# Patient Record
Sex: FEMALE | Race: WHITE | NOT HISPANIC OR LATINO | Employment: OTHER | URBAN - METROPOLITAN AREA
[De-identification: names, ages, dates, MRNs, and addresses within clinical notes are randomized per-mention and may not be internally consistent; named-entity substitution may affect disease eponyms.]

---

## 2017-11-11 ENCOUNTER — HOSPITAL ENCOUNTER (INPATIENT)
Facility: HOSPITAL | Age: 82
LOS: 5 days | Discharge: HOME | DRG: 871 | End: 2017-11-16
Attending: EMERGENCY MEDICINE | Admitting: STUDENT IN AN ORGANIZED HEALTH CARE EDUCATION/TRAINING PROGRAM
Payer: MEDICARE

## 2017-11-11 ENCOUNTER — APPOINTMENT (EMERGENCY)
Dept: RADIOLOGY | Facility: HOSPITAL | Age: 82
DRG: 871 | End: 2017-11-11
Payer: MEDICARE

## 2017-11-11 DIAGNOSIS — J44.1 COPD EXACERBATION (HCC): ICD-10-CM

## 2017-11-11 DIAGNOSIS — J18.9 PNEUMONIA: Primary | ICD-10-CM

## 2017-11-11 PROBLEM — J96.00 ACUTE RESPIRATORY FAILURE (HCC): Status: ACTIVE | Noted: 2017-11-11

## 2017-11-11 PROBLEM — E78.5 HLD (HYPERLIPIDEMIA): Status: ACTIVE | Noted: 2017-11-11

## 2017-11-11 PROBLEM — E87.6 HYPOKALEMIA: Status: ACTIVE | Noted: 2017-11-11

## 2017-11-11 LAB
ALBUMIN SERPL BCP-MCNC: 3.3 G/DL (ref 3.5–5)
ALP SERPL-CCNC: 109 U/L (ref 46–116)
ALT SERPL W P-5'-P-CCNC: 19 U/L (ref 12–78)
ANION GAP SERPL CALCULATED.3IONS-SCNC: 9 MMOL/L (ref 4–13)
AST SERPL W P-5'-P-CCNC: 11 U/L (ref 5–45)
BILIRUB SERPL-MCNC: 0.8 MG/DL (ref 0.2–1)
BUN SERPL-MCNC: 14 MG/DL (ref 5–25)
CALCIUM SERPL-MCNC: 8.4 MG/DL (ref 8.3–10.1)
CHLORIDE SERPL-SCNC: 99 MMOL/L (ref 100–108)
CO2 SERPL-SCNC: 31 MMOL/L (ref 21–32)
CREAT SERPL-MCNC: 0.57 MG/DL (ref 0.6–1.3)
ERYTHROCYTE [DISTWIDTH] IN BLOOD BY AUTOMATED COUNT: 14.4 % (ref 11.6–15.1)
GFR SERPL CREATININE-BSD FRML MDRD: 87 ML/MIN/1.73SQ M
GLUCOSE SERPL-MCNC: 126 MG/DL (ref 65–140)
HCT VFR BLD AUTO: 39.5 % (ref 37–47)
HGB BLD-MCNC: 13.1 G/DL (ref 12–16)
LACTATE SERPL-SCNC: 1.3 MMOL/L (ref 0.5–2)
LYMPHOCYTES # BLD AUTO: 0.32 THOUSAND/UL (ref 0.6–4.47)
LYMPHOCYTES # BLD AUTO: 3 %
MAGNESIUM SERPL-MCNC: 1.5 MG/DL (ref 1.6–2.6)
MCH RBC QN AUTO: 32.3 PG (ref 27–31)
MCHC RBC AUTO-ENTMCNC: 33.3 G/DL (ref 31.4–37.4)
MCV RBC AUTO: 97 FL (ref 82–98)
MONOCYTES # BLD AUTO: 0.65 THOUSAND/UL (ref 0–1.22)
MONOCYTES NFR BLD AUTO: 6 % (ref 4–12)
NEUTS BAND NFR BLD MANUAL: 31 % (ref 0–8)
NEUTS SEG # BLD: 9.83 THOUSAND/UL (ref 1.81–6.82)
NEUTS SEG NFR BLD AUTO: 60 %
NRBC BLD AUTO-RTO: 0 /100 WBCS
PLATELET # BLD AUTO: 207 THOUSANDS/UL (ref 130–400)
PLATELET BLD QL SMEAR: ADEQUATE
PMV BLD AUTO: 8 FL (ref 8.9–12.7)
POTASSIUM SERPL-SCNC: 2.8 MMOL/L (ref 3.5–5.3)
PROT SERPL-MCNC: 6.6 G/DL (ref 6.4–8.2)
RBC # BLD AUTO: 4.07 MILLION/UL (ref 4.2–5.4)
SODIUM SERPL-SCNC: 139 MMOL/L (ref 136–145)
TOTAL CELLS COUNTED SPEC: 100
TROPONIN I SERPL-MCNC: <0.02 NG/ML
WBC # BLD AUTO: 10.8 THOUSAND/UL (ref 4.8–10.8)

## 2017-11-11 PROCEDURE — 84484 ASSAY OF TROPONIN QUANT: CPT | Performed by: EMERGENCY MEDICINE

## 2017-11-11 PROCEDURE — 96365 THER/PROPH/DIAG IV INF INIT: CPT

## 2017-11-11 PROCEDURE — 36415 COLL VENOUS BLD VENIPUNCTURE: CPT | Performed by: EMERGENCY MEDICINE

## 2017-11-11 PROCEDURE — 99285 EMERGENCY DEPT VISIT HI MDM: CPT

## 2017-11-11 PROCEDURE — 71275 CT ANGIOGRAPHY CHEST: CPT

## 2017-11-11 PROCEDURE — 80053 COMPREHEN METABOLIC PANEL: CPT | Performed by: EMERGENCY MEDICINE

## 2017-11-11 PROCEDURE — 87081 CULTURE SCREEN ONLY: CPT | Performed by: STUDENT IN AN ORGANIZED HEALTH CARE EDUCATION/TRAINING PROGRAM

## 2017-11-11 PROCEDURE — 71010 HB CHEST X-RAY 1 VIEW FRONTAL (PORTABLE): CPT

## 2017-11-11 PROCEDURE — 83735 ASSAY OF MAGNESIUM: CPT | Performed by: STUDENT IN AN ORGANIZED HEALTH CARE EDUCATION/TRAINING PROGRAM

## 2017-11-11 PROCEDURE — 85027 COMPLETE CBC AUTOMATED: CPT | Performed by: EMERGENCY MEDICINE

## 2017-11-11 PROCEDURE — 96375 TX/PRO/DX INJ NEW DRUG ADDON: CPT

## 2017-11-11 PROCEDURE — 87040 BLOOD CULTURE FOR BACTERIA: CPT | Performed by: EMERGENCY MEDICINE

## 2017-11-11 PROCEDURE — 96361 HYDRATE IV INFUSION ADD-ON: CPT

## 2017-11-11 PROCEDURE — 93005 ELECTROCARDIOGRAM TRACING: CPT | Performed by: EMERGENCY MEDICINE

## 2017-11-11 PROCEDURE — 83605 ASSAY OF LACTIC ACID: CPT | Performed by: EMERGENCY MEDICINE

## 2017-11-11 PROCEDURE — 94640 AIRWAY INHALATION TREATMENT: CPT

## 2017-11-11 PROCEDURE — 87798 DETECT AGENT NOS DNA AMP: CPT | Performed by: EMERGENCY MEDICINE

## 2017-11-11 PROCEDURE — 85007 BL SMEAR W/DIFF WBC COUNT: CPT | Performed by: EMERGENCY MEDICINE

## 2017-11-11 PROCEDURE — 94664 DEMO&/EVAL PT USE INHALER: CPT

## 2017-11-11 RX ORDER — ATORVASTATIN CALCIUM 40 MG/1
40 TABLET, FILM COATED ORAL
Status: DISCONTINUED | OUTPATIENT
Start: 2017-11-12 | End: 2017-11-16 | Stop reason: HOSPADM

## 2017-11-11 RX ORDER — RISPERIDONE 0.5 MG/1
0.5 TABLET, FILM COATED ORAL
COMMUNITY

## 2017-11-11 RX ORDER — ACETAMINOPHEN 325 MG/1
650 TABLET ORAL EVERY 6 HOURS PRN
Status: DISCONTINUED | OUTPATIENT
Start: 2017-11-11 | End: 2017-11-16 | Stop reason: HOSPADM

## 2017-11-11 RX ORDER — BUDESONIDE 0.5 MG/2ML
0.5 INHALANT ORAL DAILY
COMMUNITY

## 2017-11-11 RX ORDER — LEVOFLOXACIN 5 MG/ML
750 INJECTION, SOLUTION INTRAVENOUS ONCE
Status: COMPLETED | OUTPATIENT
Start: 2017-11-11 | End: 2017-11-11

## 2017-11-11 RX ORDER — CITALOPRAM 20 MG/1
10 TABLET ORAL DAILY
Status: DISCONTINUED | OUTPATIENT
Start: 2017-11-12 | End: 2017-11-16 | Stop reason: HOSPADM

## 2017-11-11 RX ORDER — ACETAMINOPHEN 325 MG/1
650 TABLET ORAL ONCE
Status: COMPLETED | OUTPATIENT
Start: 2017-11-11 | End: 2017-11-11

## 2017-11-11 RX ORDER — METHYLPREDNISOLONE SODIUM SUCCINATE 125 MG/2ML
125 INJECTION, POWDER, LYOPHILIZED, FOR SOLUTION INTRAMUSCULAR; INTRAVENOUS ONCE
Status: COMPLETED | OUTPATIENT
Start: 2017-11-11 | End: 2017-11-11

## 2017-11-11 RX ORDER — ASPIRIN 81 MG/1
81 TABLET, CHEWABLE ORAL DAILY
Status: DISCONTINUED | OUTPATIENT
Start: 2017-11-12 | End: 2017-11-16 | Stop reason: HOSPADM

## 2017-11-11 RX ORDER — IPRATROPIUM BROMIDE AND ALBUTEROL SULFATE 2.5; .5 MG/3ML; MG/3ML
3 SOLUTION RESPIRATORY (INHALATION)
Status: DISCONTINUED | OUTPATIENT
Start: 2017-11-11 | End: 2017-11-16 | Stop reason: HOSPADM

## 2017-11-11 RX ORDER — IPRATROPIUM BROMIDE AND ALBUTEROL SULFATE 2.5; .5 MG/3ML; MG/3ML
3 SOLUTION RESPIRATORY (INHALATION) ONCE
Status: COMPLETED | OUTPATIENT
Start: 2017-11-11 | End: 2017-11-11

## 2017-11-11 RX ORDER — RISPERIDONE 0.25 MG/1
0.25 TABLET, FILM COATED ORAL DAILY
COMMUNITY

## 2017-11-11 RX ORDER — MAGNESIUM SULFATE HEPTAHYDRATE 40 MG/ML
2 INJECTION, SOLUTION INTRAVENOUS ONCE
Status: COMPLETED | OUTPATIENT
Start: 2017-11-11 | End: 2017-11-15

## 2017-11-11 RX ORDER — METHYLPREDNISOLONE SODIUM SUCCINATE 125 MG/2ML
60 INJECTION, POWDER, LYOPHILIZED, FOR SOLUTION INTRAMUSCULAR; INTRAVENOUS EVERY 8 HOURS
Status: DISCONTINUED | OUTPATIENT
Start: 2017-11-12 | End: 2017-11-12

## 2017-11-11 RX ORDER — ASPIRIN 81 MG/1
81 TABLET, CHEWABLE ORAL DAILY
COMMUNITY

## 2017-11-11 RX ORDER — ALBUTEROL SULFATE 2.5 MG/3ML
2.5 SOLUTION RESPIRATORY (INHALATION) EVERY 6 HOURS PRN
Status: DISCONTINUED | OUTPATIENT
Start: 2017-11-11 | End: 2017-11-16 | Stop reason: HOSPADM

## 2017-11-11 RX ORDER — BRIMONIDINE TARTRATE, TIMOLOL MALEATE 2; 5 MG/ML; MG/ML
1 SOLUTION/ DROPS OPHTHALMIC EVERY 12 HOURS SCHEDULED
COMMUNITY

## 2017-11-11 RX ORDER — CITALOPRAM 10 MG/1
10 TABLET ORAL DAILY
COMMUNITY

## 2017-11-11 RX ORDER — LANOLIN ALCOHOL/MO/W.PET/CERES
CREAM (GRAM) TOPICAL DAILY
COMMUNITY

## 2017-11-11 RX ORDER — ATORVASTATIN CALCIUM 40 MG/1
40 TABLET, FILM COATED ORAL DAILY
COMMUNITY

## 2017-11-11 RX ORDER — HEPARIN SODIUM 5000 [USP'U]/ML
5000 INJECTION, SOLUTION INTRAVENOUS; SUBCUTANEOUS EVERY 8 HOURS SCHEDULED
Status: DISCONTINUED | OUTPATIENT
Start: 2017-11-11 | End: 2017-11-16 | Stop reason: HOSPADM

## 2017-11-11 RX ORDER — POTASSIUM CHLORIDE 20 MEQ/1
40 TABLET, EXTENDED RELEASE ORAL ONCE
Status: COMPLETED | OUTPATIENT
Start: 2017-11-11 | End: 2017-11-11

## 2017-11-11 RX ADMIN — POTASSIUM CHLORIDE 40 MEQ: 1500 TABLET, EXTENDED RELEASE ORAL at 20:02

## 2017-11-11 RX ADMIN — IPRATROPIUM BROMIDE AND ALBUTEROL SULFATE 3 ML: .5; 3 SOLUTION RESPIRATORY (INHALATION) at 23:37

## 2017-11-11 RX ADMIN — LEVOFLOXACIN 750 MG: 5 INJECTION, SOLUTION INTRAVENOUS at 19:02

## 2017-11-11 RX ADMIN — IOHEXOL 85 ML: 350 INJECTION, SOLUTION INTRAVENOUS at 20:57

## 2017-11-11 RX ADMIN — SODIUM CHLORIDE 1000 ML: 0.9 INJECTION, SOLUTION INTRAVENOUS at 18:59

## 2017-11-11 RX ADMIN — METHYLPREDNISOLONE SODIUM SUCCINATE 125 MG: 125 INJECTION, POWDER, FOR SOLUTION INTRAMUSCULAR; INTRAVENOUS at 19:02

## 2017-11-11 RX ADMIN — IPRATROPIUM BROMIDE AND ALBUTEROL SULFATE 3 ML: .5; 3 SOLUTION RESPIRATORY (INHALATION) at 18:45

## 2017-11-11 RX ADMIN — ACETAMINOPHEN 650 MG: 325 TABLET, FILM COATED ORAL at 18:45

## 2017-11-11 RX ADMIN — HEPARIN SODIUM 5000 UNITS: 5000 INJECTION, SOLUTION INTRAVENOUS; SUBCUTANEOUS at 23:10

## 2017-11-12 LAB
AMORPH URATE CRY URNS QL MICRO: ABNORMAL /HPF
ANION GAP SERPL CALCULATED.3IONS-SCNC: 14 MMOL/L (ref 4–13)
BACTERIA UR QL AUTO: ABNORMAL /HPF
BILIRUB UR QL STRIP: NEGATIVE
BUN SERPL-MCNC: 13 MG/DL (ref 5–25)
CALCIUM SERPL-MCNC: 8.3 MG/DL (ref 8.3–10.1)
CHLORIDE SERPL-SCNC: 101 MMOL/L (ref 100–108)
CLARITY UR: CLEAR
CO2 SERPL-SCNC: 23 MMOL/L (ref 21–32)
COLOR UR: YELLOW
CREAT SERPL-MCNC: 0.93 MG/DL (ref 0.6–1.3)
ERYTHROCYTE [DISTWIDTH] IN BLOOD BY AUTOMATED COUNT: 14.5 % (ref 11.6–15.1)
FLUAV AG SPEC QL: NORMAL
FLUBV AG SPEC QL: NORMAL
GFR SERPL CREATININE-BSD FRML MDRD: 57 ML/MIN/1.73SQ M
GLUCOSE SERPL-MCNC: 231 MG/DL (ref 65–140)
GLUCOSE UR STRIP-MCNC: NEGATIVE MG/DL
HCT VFR BLD AUTO: 37.2 % (ref 37–47)
HGB BLD-MCNC: 12.4 G/DL (ref 12–16)
HGB UR QL STRIP.AUTO: ABNORMAL
KETONES UR STRIP-MCNC: ABNORMAL MG/DL
L PNEUMO1 AG UR QL IA.RAPID: NEGATIVE
LEUKOCYTE ESTERASE UR QL STRIP: NEGATIVE
MAGNESIUM SERPL-MCNC: 2.3 MG/DL (ref 1.6–2.6)
MCH RBC QN AUTO: 32.7 PG (ref 27–31)
MCHC RBC AUTO-ENTMCNC: 33.3 G/DL (ref 31.4–37.4)
MCV RBC AUTO: 98 FL (ref 82–98)
NITRITE UR QL STRIP: NEGATIVE
NON-SQ EPI CELLS URNS QL MICRO: ABNORMAL /HPF
PH UR STRIP.AUTO: 5 [PH] (ref 5–9)
PLATELET # BLD AUTO: 182 THOUSANDS/UL (ref 130–400)
PLATELET # BLD AUTO: 185 THOUSANDS/UL (ref 130–400)
PMV BLD AUTO: 8.1 FL (ref 8.9–12.7)
PMV BLD AUTO: 8.6 FL (ref 8.9–12.7)
POTASSIUM SERPL-SCNC: 2.9 MMOL/L (ref 3.5–5.3)
POTASSIUM SERPL-SCNC: 3.2 MMOL/L (ref 3.5–5.3)
PROT UR STRIP-MCNC: NEGATIVE MG/DL
RBC # BLD AUTO: 3.79 MILLION/UL (ref 4.2–5.4)
RBC #/AREA URNS AUTO: ABNORMAL /HPF
RSV B RNA SPEC QL NAA+PROBE: NORMAL
S PNEUM AG UR QL: NEGATIVE
SODIUM SERPL-SCNC: 138 MMOL/L (ref 136–145)
SP GR UR STRIP.AUTO: 1.02 (ref 1–1.03)
TROPONIN I SERPL-MCNC: 0.03 NG/ML
TROPONIN I SERPL-MCNC: 0.04 NG/ML
UROBILINOGEN UR QL STRIP.AUTO: 0.2 E.U./DL
WBC # BLD AUTO: 10.1 THOUSAND/UL (ref 4.8–10.8)
WBC #/AREA URNS AUTO: ABNORMAL /HPF

## 2017-11-12 PROCEDURE — 94760 N-INVAS EAR/PLS OXIMETRY 1: CPT

## 2017-11-12 PROCEDURE — 87449 NOS EACH ORGANISM AG IA: CPT | Performed by: STUDENT IN AN ORGANIZED HEALTH CARE EDUCATION/TRAINING PROGRAM

## 2017-11-12 PROCEDURE — 84132 ASSAY OF SERUM POTASSIUM: CPT | Performed by: STUDENT IN AN ORGANIZED HEALTH CARE EDUCATION/TRAINING PROGRAM

## 2017-11-12 PROCEDURE — 85027 COMPLETE CBC AUTOMATED: CPT | Performed by: STUDENT IN AN ORGANIZED HEALTH CARE EDUCATION/TRAINING PROGRAM

## 2017-11-12 PROCEDURE — 94640 AIRWAY INHALATION TREATMENT: CPT

## 2017-11-12 PROCEDURE — 83735 ASSAY OF MAGNESIUM: CPT | Performed by: STUDENT IN AN ORGANIZED HEALTH CARE EDUCATION/TRAINING PROGRAM

## 2017-11-12 PROCEDURE — 81001 URINALYSIS AUTO W/SCOPE: CPT | Performed by: EMERGENCY MEDICINE

## 2017-11-12 PROCEDURE — 84484 ASSAY OF TROPONIN QUANT: CPT | Performed by: STUDENT IN AN ORGANIZED HEALTH CARE EDUCATION/TRAINING PROGRAM

## 2017-11-12 PROCEDURE — 80048 BASIC METABOLIC PNL TOTAL CA: CPT | Performed by: STUDENT IN AN ORGANIZED HEALTH CARE EDUCATION/TRAINING PROGRAM

## 2017-11-12 PROCEDURE — 85049 AUTOMATED PLATELET COUNT: CPT | Performed by: STUDENT IN AN ORGANIZED HEALTH CARE EDUCATION/TRAINING PROGRAM

## 2017-11-12 RX ORDER — POTASSIUM CHLORIDE 20 MEQ/1
40 TABLET, EXTENDED RELEASE ORAL ONCE
Status: COMPLETED | OUTPATIENT
Start: 2017-11-12 | End: 2017-11-12

## 2017-11-12 RX ORDER — METHYLPREDNISOLONE SODIUM SUCCINATE 40 MG/ML
40 INJECTION, POWDER, LYOPHILIZED, FOR SOLUTION INTRAMUSCULAR; INTRAVENOUS EVERY 8 HOURS
Status: DISCONTINUED | OUTPATIENT
Start: 2017-11-12 | End: 2017-11-13

## 2017-11-12 RX ORDER — BRIMONIDINE TARTRATE, TIMOLOL MALEATE 2; 5 MG/ML; MG/ML
1 SOLUTION/ DROPS OPHTHALMIC EVERY 12 HOURS SCHEDULED
Status: DISCONTINUED | OUTPATIENT
Start: 2017-11-12 | End: 2017-11-16 | Stop reason: HOSPADM

## 2017-11-12 RX ORDER — POTASSIUM CHLORIDE 29.8 MG/ML
40 INJECTION INTRAVENOUS ONCE
Status: DISCONTINUED | OUTPATIENT
Start: 2017-11-12 | End: 2017-11-12 | Stop reason: SDUPTHER

## 2017-11-12 RX ORDER — POTASSIUM CHLORIDE 14.9 MG/ML
20 INJECTION INTRAVENOUS
Status: COMPLETED | OUTPATIENT
Start: 2017-11-12 | End: 2017-11-15

## 2017-11-12 RX ORDER — BUDESONIDE 0.5 MG/2ML
0.5 INHALANT ORAL DAILY
Status: DISCONTINUED | OUTPATIENT
Start: 2017-11-13 | End: 2017-11-16 | Stop reason: HOSPADM

## 2017-11-12 RX ADMIN — METHYLPREDNISOLONE SODIUM SUCCINATE 60 MG: 125 INJECTION, POWDER, FOR SOLUTION INTRAMUSCULAR; INTRAVENOUS at 03:40

## 2017-11-12 RX ADMIN — MAGNESIUM SULFATE HEPTAHYDRATE 2 G: 40 INJECTION, SOLUTION INTRAVENOUS at 00:10

## 2017-11-12 RX ADMIN — PIPERACILLIN SODIUM,TAZOBACTAM SODIUM 3.38 G: 3; .375 INJECTION, POWDER, FOR SOLUTION INTRAVENOUS at 07:58

## 2017-11-12 RX ADMIN — CITALOPRAM HYDROBROMIDE 10 MG: 20 TABLET ORAL at 09:32

## 2017-11-12 RX ADMIN — IPRATROPIUM BROMIDE AND ALBUTEROL SULFATE 3 ML: .5; 3 SOLUTION RESPIRATORY (INHALATION) at 13:51

## 2017-11-12 RX ADMIN — HEPARIN SODIUM 5000 UNITS: 5000 INJECTION, SOLUTION INTRAVENOUS; SUBCUTANEOUS at 21:40

## 2017-11-12 RX ADMIN — ASPIRIN 81 MG 81 MG: 81 TABLET ORAL at 09:34

## 2017-11-12 RX ADMIN — POTASSIUM CHLORIDE 20 MEQ: 200 INJECTION, SOLUTION INTRAVENOUS at 02:05

## 2017-11-12 RX ADMIN — POTASSIUM CHLORIDE 40 MEQ: 1500 TABLET, EXTENDED RELEASE ORAL at 17:14

## 2017-11-12 RX ADMIN — PIPERACILLIN AND TAZOBACTAM 3.38 G: 3; .375 INJECTION, POWDER, LYOPHILIZED, FOR SOLUTION INTRAVENOUS; PARENTERAL at 00:39

## 2017-11-12 RX ADMIN — POTASSIUM CHLORIDE 20 MEQ: 200 INJECTION, SOLUTION INTRAVENOUS at 03:45

## 2017-11-12 RX ADMIN — METHYLPREDNISOLONE SODIUM SUCCINATE 60 MG: 125 INJECTION, POWDER, FOR SOLUTION INTRAMUSCULAR; INTRAVENOUS at 11:05

## 2017-11-12 RX ADMIN — PIPERACILLIN SODIUM,TAZOBACTAM SODIUM 3.38 G: 3; .375 INJECTION, POWDER, FOR SOLUTION INTRAVENOUS at 19:09

## 2017-11-12 RX ADMIN — ATORVASTATIN CALCIUM 40 MG: 40 TABLET, FILM COATED ORAL at 17:14

## 2017-11-12 RX ADMIN — IPRATROPIUM BROMIDE AND ALBUTEROL SULFATE 3 ML: .5; 3 SOLUTION RESPIRATORY (INHALATION) at 08:27

## 2017-11-12 RX ADMIN — METHYLPREDNISOLONE SODIUM SUCCINATE 40 MG: 40 INJECTION, POWDER, FOR SOLUTION INTRAMUSCULAR; INTRAVENOUS at 19:09

## 2017-11-12 RX ADMIN — HEPARIN SODIUM 5000 UNITS: 5000 INJECTION, SOLUTION INTRAVENOUS; SUBCUTANEOUS at 14:11

## 2017-11-12 RX ADMIN — HEPARIN SODIUM 5000 UNITS: 5000 INJECTION, SOLUTION INTRAVENOUS; SUBCUTANEOUS at 05:26

## 2017-11-12 RX ADMIN — VANCOMYCIN HYDROCHLORIDE 750 MG: 750 INJECTION, SOLUTION INTRAVENOUS at 01:36

## 2017-11-12 RX ADMIN — IPRATROPIUM BROMIDE AND ALBUTEROL SULFATE 3 ML: .5; 3 SOLUTION RESPIRATORY (INHALATION) at 03:31

## 2017-11-12 RX ADMIN — IPRATROPIUM BROMIDE AND ALBUTEROL SULFATE 3 ML: .5; 3 SOLUTION RESPIRATORY (INHALATION) at 21:45

## 2017-11-12 RX ADMIN — PIPERACILLIN SODIUM,TAZOBACTAM SODIUM 3.38 G: 3; .375 INJECTION, POWDER, FOR SOLUTION INTRAVENOUS at 14:11

## 2017-11-12 NOTE — ED PROCEDURE NOTE
PROCEDURE  ECG 12 Lead Documentation  Date/Time: 11/11/2017 9:41 PM  Performed by: Yahir Mason by: Wayne Ang     ECG reviewed by me, the ED Provider: yes    Patient location:  ED  Previous ECG:     Previous ECG:  Unavailable  Interpretation:     Interpretation: abnormal    Rate:     ECG rate:  92    ECG rate assessment: normal    Rhythm:     Rhythm: sinus rhythm    Ectopy:     Ectopy: none    Conduction:     Conduction: abnormal      Abnormal conduction: complete LBBB    ST segments:     ST segments:  Normal  T waves:     T waves: normal

## 2017-11-12 NOTE — H&P
History and Physical - [facility] Internal Medicine    Patient Information: Neyda Mott 80 y o  female MRN: [id number]  Unit/Bed#: MATTIE Encounter: [id number]  Admitting Physician: Gracy Hyatt MD  PCP: SHERYL Mitchell  Date of Admission:  11/11/17    Chief Complaint:     Fever and shaking chills     of Present Illness:    Neyda Mott is a 80 y o  female with a PMH of HLD and Depression who presents with fever and shortness of breath  Of note, the pt, though alert and oriented x3, only provides one word answers and is withdrawn  I contacted the NH where they state that she this is her norm as she is quite withdrawn and doesn't interact much  They state that she was in her normal health until earlier today when they noticed that she was having shaking chills  They checked her temp which was 100 1F at the time  The attendant was not present for this and can only provide me with what is written  She was sent here where she was found to be hypoxic  She was given duonebs and Solumedrol along with Levaquin as her CT was concerning for Pneumonia  Currently the pt is resting comfortably  She denies any chest pain, shortness of breath, abdominal pain, cough or melena/blood in her stools  No other complaints or concerns  Review of Systems:    Review of Systems   Constitutional: Positive for chills and fever  Respiratory: Positive for shortness of breath  Cardiovascular: Negative for chest pain and leg swelling  Gastrointestinal: Negative for abdominal pain  Genitourinary: Negative for hematuria  Musculoskeletal: Positive for arthralgias  Neurological: Negative for syncope  Psychiatric/Behavioral: Negative for confusion  Past Medical and Surgical History:     Past Medical History:   Diagnosis Date    Constipation     Depression     ETOH abuse        History reviewed  No pertinent surgical history      Meds/Allergies:    PTA meds:   Prior to Admission Medications   Prescriptions Last Dose Informant Patient Reported? Taking?   aspirin 81 mg chewable tablet   Yes Yes   Sig: Chew 81 mg daily   atorvastatin (LIPITOR) 40 mg tablet   Yes Yes   Sig: Take 40 mg by mouth daily   brimonidine-timolol (COMBIGAN) 0 2-0 5 %   Yes Yes   Sig: Administer 1 drop to both eyes every 12 (twelve) hours   budesonide (PULMICORT) 0 5 mg/2 mL nebulizer solution   Yes Yes   Sig: Take 0 5 mg by nebulization daily   citalopram (CeleXA) 10 mg tablet   Yes Yes   Sig: Take 10 mg by mouth daily   cyanocobalamin (VITAMIN B-12) 1,000 mcg tablet   Yes Yes   Sig: Take by mouth daily   risperiDONE (RisperDAL) 0 25 mg tablet   Yes Yes   Sig: Take 0 25 mg by mouth daily   risperiDONE (RisperDAL) 0 5 mg tablet   Yes Yes   Sig: Take 0 5 mg by mouth daily at bedtime   timolol (BETIMOL) 0 5 % ophthalmic solution   Yes Yes   Si drop 2 (two) times a day      Facility-Administered Medications: None       Allergies: No Known Allergies  History:     Marital Status:    History   Alcohol Use No     History   Smoking Status    Never Smoker   Smokeless Tobacco    Not on file     History   Drug use: Unknown       Family History:     Mother: CAD   Father: unknown     Physical Exam:     Vitals:   Blood Pressure: 150/70 (17)  Pulse: 76 (17)  Temperature: 98 8 °F (37 1 °C) (17)  Temp Source: Tympanic (17)  Respirations: 19 (17)  SpO2: 97 % (17)    Physical Exam:   General: in no acute distress  HEENT: atraumatic, normocephalic  Skin: no jaundice  CVS: RRR, systolic murmur appreciated along sternal border  Lungs: crackles in right lung, no wheezing or rales appreciated   Abdomen: soft, nondistended, bowel sounds normal, nontender upon palpation, no guarding or rebound tenderness  Extremities: no edema, no calf swelling or tenderness  Neuro: alert and oriented x3, normal handgrip strength bilaterally, sensation intact in all extremities  Psych: calm, cooperative      Lab Results: I have personally reviewed pertinent reports  Results from last 7 days  Lab Units 11/11/17  1857   WBC Thousand/uL 10 80   HEMOGLOBIN g/dL 13 1   HEMATOCRIT % 39 5   PLATELETS Thousands/uL 207   LYMPHO PCT % 3   MONO PCT MAN % 6       Results from last 7 days  Lab Units 11/11/17  1857   SODIUM mmol/L 139   POTASSIUM mmol/L 2 8*   CHLORIDE mmol/L 99*   CO2 mmol/L 31   BUN mg/dL 14   CREATININE mg/dL 0 57*   CALCIUM mg/dL 8 4   TOTAL PROTEIN g/dL 6 6   BILIRUBIN TOTAL mg/dL 0 80   ALK PHOS U/L 109   ALT U/L 19   AST U/L 11   GLUCOSE RANDOM mg/dL 126           Imaging:     Cta Ed Chest Pe Study    Result Date: 11/11/2017  Narrative: CTA - CHEST WITH IV CONTRAST - PULMONARY ANGIOGRAM INDICATION: Shortness of breath, hypoxia  COMPARISON: None  TECHNIQUE: CTA examination of the chest was performed using angiographic technique according to a protocol specifically tailored to evaluate for pulmonary embolism  Reformatted images were created in axial, sagittal, and coronal planes  In addition, coronal 3D MIP postprocessing was performed on the acquisition scanner  Radiation dose length product (DLP) for this visit:  573 84 mGy-cm   This examination, like all CT scans performed in the [facility], was performed utilizing techniques to minimize radiation dose exposure, including the use of iterative  reconstruction and automated exposure control  IV Contrast:  85 mL of iohexol (OMNIPAQUE)      FINDINGS: PULMONARY ARTERIAL TREE:  No pulmonary embolus is seen  Central prominence of the pulmonary arteries which may be related to pulmonary artery hypertension  LUNGS:  Mild patchy opacity in the right lower lobe and related to atelectasis or infiltrate  Bronchial wall thickening noted in the right lower lobe  Scattered linear atelectasis versus scarring in the right upper lobe  Mild dependent atelectatic changes  Mild interlobular septal thickening, mild CHF is not excluded    Volume loss on the right  PLEURA:  Unremarkable  HEART/AORTA:  Heart and mediastinum are shifted to the right  Thoracic aorta is tortuous with moderate wall calcification  Heart is mildly enlarged  Mitral valve calcification noted  MEDIASTINUM AND FRANCES:  Prominent subcarinal lymph node measures 2 7 x 1 8 cm  No significant hilar lymphadenopathy  CHEST WALL AND LOWER NECK:       Normal  VISUALIZED STRUCTURES IN THE UPPER ABDOMEN:  Infrarenal abdominal aortic aneurysm measures 3 1 x 2 8 cm in transaxial dimension  Small gallstones layering in the gallbladder  Right adrenal appears thickened  Left adrenal gland is unremarkable  OSSEOUS STRUCTURES:  No acute fracture or destructive osseous lesion  Multilevel degenerative changes in the spine  Curvature of the thoracic spine to the left  Impression: 1  No evidence of pulmonary embolism  2   Patchy opacity in the right lower lobe may be related to atelectasis or infiltrate  Bronchial thickening in the right lower lobe  Mild interlobular septal thickening, mild CHF is not excluded  3   Single prominent subcarinal lymph node, nonspecific finding  Consider continued follow-up  4   Mildly aneurysmal abdominal aorta  Gallstones  ##sigslh##sigslh    Workstation performed: [id number]         Assessment/Plan    Viktor Mendosa is a 80 y o  female with a PMH of HLD and Depression who presents with fever and shortness of breath  Acute Respiratory Failure secondary to HCAP and COPD Exacerbation  - CT reveals no PE but an infiltrate in right lung  She was given Solumedrol, Duonebs and Levaquin in the ED  Influenza test pending  Will admit to Medicine, broaden coverage with Vancomycin and Zosyn for now as she is a NH resident with plan to deescalate accordingly, order urine strep/legionella and start on scheduled Solumedrol and Duonebs along with Albuterol prn     Hypokalemia  - replaced in the ED   Will order Mag level and follow up K in several hours     LBBB  - no prior EKG available to review  Contacted NH and they as well do not have any prior EKG or documentation in chart regarding this  Contacted her daughter and she states that she is unaware of any h/o CAD or LBBB  She also reports that she has a h/o AS but refused any intervention a year ago  At this time, she is not having any active chest pain and is vitally stable  Her initial troponin is WNL  Will place on telemetry, trend troponin and resume Asprin and Statin  Advised daughter to bring in Cardiologist contact info tomorrow    Prominent Subcarinal Lymph Node seen on CT imaging  - will require follow up imaging     HLD  - continue Statin    Depression  - continue [facility] Problem List:     Principal Problem:    Acute respiratory failure ([facility] )  Active Problems:    HCAP (healthcare-associated pneumonia)    COPD exacerbation ([facility] )    HLD (hyperlipidemia)    Hypokalemia        VTE Prophylaxis: Heparin   Code Status: No Order    Anticipated Length of Stay:  Patient will be admitted on an Inpatient basis with an anticipated length of stay of atleast 2 midnights  Total Time for Visit, including Counseling / Coordination of Care: 30 minutes  Greater than 50% of this total time spent on direct patient counseling and coordination of care

## 2017-11-12 NOTE — PROGRESS NOTES
[address] Internal Medicine Progress Note  Patient: Altaf Mohan 80 y o  female   MRN: [id number]  PCP: SHERYL Cordova  Unit/Bed#: [address] [place] [address] Encounter: [id number]  Date Of Visit: 11/12/17    Problem List:    Principal Problem:    Acute respiratory failure ([facility] )  Active Problems:    HCAP (healthcare-associated pneumonia)    COPD exacerbation ([facility] )    HLD (hyperlipidemia)    Hypokalemia      Assessment & Plan:    1  Sepsis, POA secondary to right lower lobe pneumonia -afebrile at present  Urine Legionella and pneumococcal antigen is negative  Influenza PCR negative  Unable to provide sputum  MRSA surveillance is negative  Will continue IV Zosyn  Discontinue vancomycin  Follow-up blood culture  Monitor  2  COPD exacerbation secondary to 1  Continue bronchodilators decrease IV steroid to 40 mg q 8 hours  3  Acute hypoxic respiratory failure secondary to above-continue supplemental oxygen and monitor  4  Hypokalemia-replete and monitor  5  Abnormal EKG, LBBB-likely old  Troponin negative  Flu arrhythmia on telemetry  6  History of aortic stenosis  7  Prominent  subcarinal lymph node likely reactive-monitor  8  Dyslipidemia-on Lipitor  9  Depression-on Celexa      VTE Pharmacologic Prophylaxis:   Pharmacologic: Heparin  Mechanical VTE Prophylaxis in Place: Yes    Patient Centered Rounds: I have performed bedside rounds with nursing staff today  Discussions with Specialists or Other Care Team Provider: Yes    Education and Discussions with Family / Patient:Yes    Time Spent for Care: 45 minutes  More than 50% of total time spent on counseling and coordination of care as described above      Current Length of Stay: 1 day(s)    Current Patient Status: Inpatient     Discharge Plan:  Back to [facility] when improved    Code Status: Level 1 - Full Code      Subjective:   Reports having intermittent cough  Denies chest pain or shortness of breath  Aware that she is in the hospital and being treated for pneumonia  Denies any URI or urinary symptoms    Objective:   Not in distress      Limited secondary to underlying psychiatric illness    Vitals:   Temp (24hrs), Av 1 °F (36 7 °C), Min:97 4 °F (36 3 °C), Max:98 8 °F (37 1 °C)    HR:  [72-93] 93  Resp:  [16-19] 18  BP: (123-182)/(62-95) 123/62  SpO2:  [86 %-100 %] 95 %  Body mass index is 22 58 kg/m²  Input and Output Summary (last 24 hours): Intake/Output Summary (Last 24 hours) at 17 1649  Last data filed at 17 0848   Gross per 24 hour   Intake              150 ml   Output              100 ml   Net               50 ml       Physical Exam:     Physical Exam   Constitutional: She appears well-developed  No distress  HENT:   Head: Normocephalic and atraumatic  Nose: Nose normal    Eyes: Conjunctivae are normal  Pupils are equal, round, and reactive to light  Neck: Normal range of motion  Neck supple  No JVD present  Cardiovascular: Normal rate and regular rhythm  Exam reveals no gallop and no friction rub  Murmur heard  Pulmonary/Chest: Effort normal  No tachypnea  No respiratory distress  She has decreased breath sounds  She has no wheezes  She has rhonchi  She has no rales  She exhibits no tenderness  Abdominal: Soft  Bowel sounds are normal  She exhibits no distension  There is no tenderness  There is no rebound and no guarding  Musculoskeletal: She exhibits no edema  Neurological: She is alert  She is not disoriented  She displays no tremor  No cranial nerve deficit or sensory deficit  GCS eye subscore is 4  GCS verbal subscore is 5  GCS motor subscore is 6  Skin: Skin is warm and dry  No rash noted  Psychiatric: Her affect is blunt         Additional Data:     Labs:      Results from last 7 days  Lab Units 17  0556  17  1857   WBC Thousand/uL 10 10  --  10 80   HEMOGLOBIN g/dL 12 4  --  13 1   HEMATOCRIT % 37 2  --  39 5   PLATELETS Thousands/uL 182  < > 207   LYMPHO PCT %  --   --  3   MONO PCT MAN %  --   --  6   < > = values in this interval not displayed  Results from last 7 days  Lab Units 11/12/17  0556  11/11/17  1857   SODIUM mmol/L 138  --  139   POTASSIUM mmol/L 3 2*  < > 2 8*   CHLORIDE mmol/L 101  --  99*   CO2 mmol/L 23  --  31   BUN mg/dL 13  --  14   CREATININE mg/dL 0 93  --  0 57*   CALCIUM mg/dL 8 3  --  8 4   TOTAL PROTEIN g/dL  --   --  6 6   BILIRUBIN TOTAL mg/dL  --   --  0 80   ALK PHOS U/L  --   --  109   ALT U/L  --   --  19   AST U/L  --   --  11   GLUCOSE RANDOM mg/dL 231*  --  126   < > = values in this interval not displayed  * I Have Reviewed All Lab Data Listed Above  * Additional Pertinent Lab Tests Reviewed: [address] Admission Reviewed    Imaging:  Xr Chest 1 View Portable    Result Date: 11/12/2017  Narrative: CHEST INDICATION:  Shortness of breath COMPARISON:  None VIEWS:   AP frontal IMAGES:  1 FINDINGS:     Heart shadow appears unremarkable  Atherosclerotic vascular calcifications are noted  Lung volumes diminished  Elevation of left hemidiaphragm  Visualized osseous structures appear within normal limits for the patient's age  Impression: No active pulmonary disease on examination which is somewhat limited secondary to low lung volumes  Workstation performed: [id number]     Cta Ed Chest Pe Study    Result Date: 11/11/2017  Narrative: CTA - CHEST WITH IV CONTRAST - PULMONARY ANGIOGRAM INDICATION: Shortness of breath, hypoxia  COMPARISON: None  TECHNIQUE: CTA examination of the chest was performed using angiographic technique according to a protocol specifically tailored to evaluate for pulmonary embolism  Reformatted images were created in axial, sagittal, and coronal planes  In addition, coronal 3D MIP postprocessing was performed on the acquisition scanner  Radiation dose length product (DLP) for this visit:  573 84 mGy-cm     This examination, like all CT scans performed in the [facility], was performed utilizing techniques to minimize radiation dose exposure, including the use of iterative  reconstruction and automated exposure control  IV Contrast:  85 mL of iohexol (OMNIPAQUE)      FINDINGS: PULMONARY ARTERIAL TREE:  No pulmonary embolus is seen  Central prominence of the pulmonary arteries which may be related to pulmonary artery hypertension  LUNGS:  Mild patchy opacity in the right lower lobe and related to atelectasis or infiltrate  Bronchial wall thickening noted in the right lower lobe  Scattered linear atelectasis versus scarring in the right upper lobe  Mild dependent atelectatic changes  Mild interlobular septal thickening, mild CHF is not excluded  Volume loss on the right  PLEURA:  Unremarkable  HEART/AORTA:  Heart and mediastinum are shifted to the right  Thoracic aorta is tortuous with moderate wall calcification  Heart is mildly enlarged  Mitral valve calcification noted  MEDIASTINUM AND FRANCES:  Prominent subcarinal lymph node measures 2 7 x 1 8 cm  No significant hilar lymphadenopathy  CHEST WALL AND LOWER NECK:       Normal  VISUALIZED STRUCTURES IN THE UPPER ABDOMEN:  Infrarenal abdominal aortic aneurysm measures 3 1 x 2 8 cm in transaxial dimension  Small gallstones layering in the gallbladder  Right adrenal appears thickened  Left adrenal gland is unremarkable  OSSEOUS STRUCTURES:  No acute fracture or destructive osseous lesion  Multilevel degenerative changes in the spine  Curvature of the thoracic spine to the left  Impression: 1  No evidence of pulmonary embolism  2   Patchy opacity in the right lower lobe may be related to atelectasis or infiltrate  Bronchial thickening in the right lower lobe  Mild interlobular septal thickening, mild CHF is not excluded  3   Single prominent subcarinal lymph node, nonspecific finding  Consider continued follow-up  4   Mildly aneurysmal abdominal aorta  Gallstones   ##sigslh##sigslh    Workstation performed: [id number]     Imaging Reports Reviewed by myself    Cultures:   Blood Culture: No results found for: BLOODCX  Urine Culture: No results found for: URINECX  Sputum Culture: No components found for: SPUTUMCX  Wound Culture: No results found for: WOUNDCULT    Last 24 Hours Medication List:     aspirin 81 mg Oral Daily   atorvastatin 40 mg Oral Daily With Dinner   citalopram 10 mg Oral Daily   heparin (porcine) 5,000 Units Subcutaneous Q8H [facility]   ipratropium-albuterol 3 mL Nebulization Q6H   methylPREDNISolone sodium succinate 40 mg Intravenous Q8H   piperacillin-tazobactam (ZOSYN) 3 375 g in dextrose 5% 100 mL IVPB 3 375 g Intravenous Q6H   vancomycin 15 mg/kg Intravenous Q24H        Today, Patient Was Seen By: Sheila Funez MD    ** Please Note: Dragon 360 Dictation voice to text software may have been used in the creation of this document   **

## 2017-11-12 NOTE — ED PROVIDER NOTES
History  Chief Complaint   Patient presents with    Shortness of Breath     pt [facility]  pt was giovanni with an 02 of 84% RA via EMS  Pt presents to the ed, with decreased breath sounds  Pt is alert x3, unaware of why she is at the hospital       Patient presents from [facility] for dyspnea with hypoxia on RA  Patient denies any complaints on exam  Answers questions approipaitely but affect flat and answers short  History provided by:  Patient   used: No    Shortness of Breath   Associated symptoms: cough    Associated symptoms: no chest pain        Prior to Admission Medications   Prescriptions Last Dose Informant Patient Reported? Taking?   aspirin 81 mg chewable tablet   Yes Yes   Sig: Chew 81 mg daily   atorvastatin (LIPITOR) 40 mg tablet   Yes Yes   Sig: Take 40 mg by mouth daily   brimonidine-timolol (COMBIGAN) 0 2-0 5 %   Yes Yes   Sig: Administer 1 drop to both eyes every 12 (twelve) hours   budesonide (PULMICORT) 0 5 mg/2 mL nebulizer solution   Yes Yes   Sig: Take 0 5 mg by nebulization daily   citalopram (CeleXA) 10 mg tablet   Yes Yes   Sig: Take 10 mg by mouth daily   cyanocobalamin (VITAMIN B-12) 1,000 mcg tablet   Yes Yes   Sig: Take by mouth daily   risperiDONE (RisperDAL) 0 25 mg tablet   Yes Yes   Sig: Take 0 25 mg by mouth daily   risperiDONE (RisperDAL) 0 5 mg tablet   Yes Yes   Sig: Take 0 5 mg by mouth daily at bedtime   timolol (BETIMOL) 0 5 % ophthalmic solution   Yes Yes   Si drop 2 (two) times a day      Facility-Administered Medications: None       Past Medical History:   Diagnosis Date    Constipation     Depression     ETOH abuse        History reviewed  No pertinent surgical history  History reviewed  No pertinent family history  I have reviewed and agree with the history as documented      Social History   Substance Use Topics    Smoking status: Current Every Day Smoker    Smokeless tobacco: Never Used    Alcohol use No Review of Systems   Respiratory: Positive for cough and shortness of breath  Cardiovascular: Negative for chest pain  All other systems reviewed and are negative  Physical Exam  ED Triage Vitals   Temperature Pulse Respirations Blood Pressure SpO2   11/11/17 1835 11/11/17 1835 11/11/17 1835 11/11/17 1835 11/11/17 1835   98 8 °F (37 1 °C) 89 16 (!) 182/95 (!) 86 %      Temp Source Heart Rate Source Patient Position - Orthostatic VS BP Location FiO2 (%)   11/11/17 1835 11/11/17 1835 11/11/17 1835 11/11/17 1835 --   Tympanic Monitor Lying Right arm       Pain Score       11/11/17 1845       No Pain           Orthostatic Vital Signs  Vitals:    11/11/17 2300 11/11/17 2335 11/12/17 0801 11/12/17 0828   BP: 140/69  166/71    Pulse: 73 76 79 72   Patient Position - Orthostatic VS: Lying  Lying        Physical Exam   Constitutional: She is oriented to person, place, and time  No distress  HENT:   Mouth/Throat: Oropharynx is clear and moist    Eyes: Pupils are equal, round, and reactive to light  Neck: Normal range of motion  Cardiovascular: Normal rate, regular rhythm and intact distal pulses  Pulmonary/Chest: Effort normal  No respiratory distress  She has wheezes  Abdominal: Soft  There is no tenderness  Musculoskeletal: Normal range of motion  Neurological: She is alert and oriented to person, place, and time  Skin: Capillary refill takes less than 2 seconds  She is not diaphoretic  Nursing note and vitals reviewed        ED Medications  Medications   aspirin chewable tablet 81 mg (81 mg Oral Given 11/12/17 0934)   atorvastatin (LIPITOR) tablet 40 mg (not administered)   citalopram (CeleXA) tablet 10 mg (10 mg Oral Given 11/12/17 0932)   heparin (porcine) subcutaneous injection 5,000 Units (5,000 Units Subcutaneous Given 11/12/17 0526)   acetaminophen (TYLENOL) tablet 650 mg (not administered)   methylPREDNISolone sodium succinate (Solu-MEDROL) injection 60 mg (60 mg Intravenous Given 11/12/17 0340)   ipratropium-albuterol (DUO-NEB) 0 5-2 5 mg/mL inhalation solution 3 mL (3 mL Nebulization Given 11/12/17 0827)   albuterol inhalation solution 2 5 mg (not administered)   vancomycin (VANCOCIN) IVPB (premix) 750 mg (750 mg Intravenous New Bag 11/12/17 0136)   piperacillin-tazobactam (ZOSYN) 3 375 g in dextrose 5 % 100 mL IVPB (3 375 g Intravenous New Bag 11/12/17 0758)   sodium chloride 0 9 % bolus 1,000 mL (1,000 mL Intravenous New Bag 11/11/17 1859)   ipratropium-albuterol (DUO-NEB) 0 5-2 5 mg/mL inhalation solution 3 mL (3 mL Nebulization Given 11/11/17 1845)   methylPREDNISolone sodium succinate (Solu-MEDROL) injection 125 mg (125 mg Intravenous Given 11/11/17 1902)   acetaminophen (TYLENOL) tablet 650 mg (650 mg Oral Given 11/11/17 1845)   levofloxacin (LEVAQUIN) IVPB (premix) 750 mg (0 mg Intravenous Stopped 11/11/17 2032)   potassium chloride (K-DUR,KLOR-CON) CR tablet 40 mEq (40 mEq Oral Given 11/11/17 2002)   iohexol (OMNIPAQUE) 350 MG/ML injection (MULTI-DOSE) 100 mL (85 mL Intravenous Given 11/11/17 2057)   magnesium sulfate 2 g/50 mL IVPB (premix) 2 g (2 g Intravenous New Bag 11/12/17 0010)   potassium chloride 20 mEq IVPB (premix) (20 mEq Intravenous New Bag 11/12/17 0345)       Diagnostic Studies  Results Reviewed     Procedure Component Value Units Date/Time    UA w Reflex to Microscopic w Reflex to Culture [[id number]]  (Abnormal) Collected:  11/12/17 0548    Lab Status:  Final result Specimen:  Urine from Urine, Other Updated:  11/12/17 0650     Color, UA Yellow     Clarity, UA Clear     Specific [place], UA 1 020     pH, UA 5 0     Leukocytes, UA Negative     Nitrite, UA Negative     Protein, UA Negative mg/dl      Glucose, UA Negative mg/dl      Ketones, UA Trace (A) mg/dl      Urobilinogen, UA 0 2 E U /dl      Bilirubin, UA Negative     Blood, UA Small (A)    Potassium [[id number]]  (Abnormal) Collected:  11/12/17 0033    Lab Status:  Final result Specimen:  Blood from Arm, Left Updated:  11/12/17 0103     Potassium 2 9 (L) mmol/L     Influenza A/B and RSV by PCR (indicated for patients >2 mo of age) [[id number]] Collected:  11/11/17 0033    Lab Status: In process Specimen:  Nasopharyngeal from Nasopharyngeal Swab Updated:  11/12/17 0048    Magnesium [[id number]]  (Abnormal) Collected:  11/11/17 1857    Lab Status:  Final result Specimen:  Blood from Arm, Left Updated:  11/11/17 2240     Magnesium 1 5 (L) mg/dL     Troponin I [[id number]]  (Normal) Collected:  11/11/17 2149    Lab Status:  Final result Specimen:  Blood Updated:  11/11/17 2210     Troponin I <0 02 ng/mL     Narrative:         [employer] Chemistry analyzer 99% cutoff is > 0 04 ng/mL in network labs    o cTnI 99% cutoff is useful only when applied to patients in the clinical setting of myocardial ischemia  o cTnI 99% cutoff should be interpreted in the context of clinical history, ECG findings and possibly cardiac imaging to establish correct diagnosis  o cTnI 99% cutoff may be suggestive but clearly not indicative of a coronary event without the clinical setting of myocardial ischemia  Comprehensive metabolic panel [[id number]]  (Abnormal) Collected:  11/11/17 1857    Lab Status:  Final result Specimen:  Blood from Arm, Left Updated:  11/11/17 1941     Sodium 139 mmol/L      Potassium 2 8 (L) mmol/L      Chloride 99 (L) mmol/L      CO2 31 mmol/L      Anion Gap 9 mmol/L      BUN 14 mg/dL      Creatinine 0 57 (L) mg/dL      Glucose 126 mg/dL      Calcium 8 4 mg/dL      AST 11 U/L      ALT 19 U/L      Alkaline Phosphatase 109 U/L      Total Protein 6 6 g/dL      Albumin 3 3 (L) g/dL      Total Bilirubin 0 80 mg/dL      eGFR 87 ml/min/1 73sq m     Narrative:         National Kidney Disease Education Program recommendations are as follows:  GFR calculation is accurate only with a steady state creatinine  Chronic Kidney disease less than 60 ml/min/1 73 sq  meters  Kidney failure less than 15 ml/min/1 73 sq  meters      CBC and differential [[id number]]  (Abnormal) Collected:  11/11/17 1857    Lab Status:  Final result Specimen:  Blood from Arm, Left Updated:  11/11/17 1941     WBC 10 80 Thousand/uL      RBC 4 07 (L) Million/uL      Hemoglobin 13 1 g/dL      Hematocrit 39 5 %      MCV 97 fL      MCH 32 3 (H) pg      MCHC 33 3 g/dL      RDW 14 4 %      MPV 8 0 (L) fL      Platelets 204 Thousands/uL      nRBC 0 /100 WBCs     Lactic acid, plasma [[id number]]  (Normal) Collected:  11/11/17 1857    Lab Status:  Final result Specimen:  Blood from Arm, Left Updated:  11/1934     LACTIC ACID 1 3 mmol/L     Narrative:         Result may be elevated if tourniquet was used during collection  Blood culture #2 [[id number]] Collected:  11/11/17 1857    Lab Status: In process Specimen:  Blood from Arm, Left Updated:  11/11/17 1908    Blood culture #1 [[id number]] Collected:  11/11/17 1857    Lab Status: In process Specimen:  Blood from Arm, Left Updated:  11/11/17 1908                 XR chest 1 view portable   Final Result by Lourdes Carmona DO (11/12 3841)      No active pulmonary disease on examination which is somewhat limited secondary to low lung volumes  Workstation performed: [id number]         CTA ED chest PE study   Final Result by Diane Cole MD (11/11 2152)         1  No evidence of pulmonary embolism  2   Patchy opacity in the right lower lobe may be related to atelectasis or infiltrate  Bronchial thickening in the right lower lobe  Mild interlobular septal thickening, mild CHF is not excluded  3   Single prominent subcarinal lymph node, nonspecific finding  Consider continued follow-up  4   Mildly aneurysmal abdominal aorta  Gallstones                 ##sigslh##sigslh                        Workstation performed: [id number]                    Procedures  Procedures       Phone Contacts  ED Phone Contact    ED Course  ED Course                                MDM  Number of Diagnoses or Management Options  COPD exacerbation [facility]):   Pneumonia:   Diagnosis management comments: CXR: elevation left hemidiaphragm, poor inspiration no infiltrate seen as read by me  Pulse ox 84% on RA indicating poor oxygenation  Pulse ox 97% on NC indicating adequate oxygenation       Amount and/or Complexity of Data Reviewed  Clinical lab tests: reviewed and ordered  Tests in the radiology section of CPT®: ordered and reviewed  Discuss the patient with other providers: yes  Independent visualization of images, tracings, or specimens: yes    Patient Progress  Patient progress: stable    CritCare Time    Disposition  Final diagnoses:   Pneumonia   COPD exacerbation ([address] )     Time reflects when diagnosis was documented in both MDM as applicable and the Disposition within this note     Time User Action Codes Description Comment    11/11/2017 10:02 PM Eulalia De Dios [J18 9] Pneumonia     11/11/2017 10:02 PM Ramin Boxtachris [J44 1] COPD exacerbation [facility])       ED Disposition     ED Disposition Condition Comment    Admit  Case was discussed with Dr Hernandez Roach and the patient's admission status was agreed to be admission to the service of Dr Hernandez Roach          Follow-up Information    None       Current Discharge Medication List      CONTINUE these medications which have NOT CHANGED    Details   aspirin 81 mg chewable tablet Chew 81 mg daily      atorvastatin (LIPITOR) 40 mg tablet Take 40 mg by mouth daily      brimonidine-timolol (COMBIGAN) 0 2-0 5 % Administer 1 drop to both eyes every 12 (twelve) hours      budesonide (PULMICORT) 0 5 mg/2 mL nebulizer solution Take 0 5 mg by nebulization daily      citalopram (CeleXA) 10 mg tablet Take 10 mg by mouth daily      cyanocobalamin (VITAMIN B-12) 1,000 mcg tablet Take by mouth daily      !! risperiDONE (RisperDAL) 0 25 mg tablet Take 0 25 mg by mouth daily      !! risperiDONE (RisperDAL) 0 5 mg tablet Take 0 5 mg by mouth daily at bedtime      timolol (BETIMOL) 0 5 % ophthalmic solution 1 drop 2 (two) times a day       !! - Potential duplicate medications found  Please discuss with provider  No discharge procedures on file      ED Provider  Electronically Signed by           Raman Ford DO  11/12/17 5295

## 2017-11-12 NOTE — PLAN OF CARE
Knowledge Deficit     Patient/family/caregiver demonstrates understanding of disease process, treatment plan, medications, and discharge instructions Progressing        MUSCULOSKELETAL - ADULT     Maintain or return mobility to safest level of function Progressing        Potential for Falls     Patient will remain free of falls Progressing        Prexisting or High Potential for Compromised Skin Integrity     Skin integrity is maintained or improved Progressing        RESPIRATORY - ADULT     Achieves optimal ventilation and oxygenation Progressing        SAFETY ADULT     Maintain or return to baseline ADL function Progressing     Maintain or return mobility status to optimal level Progressing     Patient will remain free of falls Progressing

## 2017-11-13 LAB
ANION GAP SERPL CALCULATED.3IONS-SCNC: 12 MMOL/L (ref 4–13)
ATRIAL RATE: 92 BPM
BUN SERPL-MCNC: 23 MG/DL (ref 5–25)
CALCIUM SERPL-MCNC: 8.2 MG/DL (ref 8.3–10.1)
CHLORIDE SERPL-SCNC: 105 MMOL/L (ref 100–108)
CO2 SERPL-SCNC: 23 MMOL/L (ref 21–32)
CREAT SERPL-MCNC: 0.86 MG/DL (ref 0.6–1.3)
ERYTHROCYTE [DISTWIDTH] IN BLOOD BY AUTOMATED COUNT: 14.4 % (ref 11.6–15.1)
GFR SERPL CREATININE-BSD FRML MDRD: 63 ML/MIN/1.73SQ M
GLUCOSE SERPL-MCNC: 216 MG/DL (ref 65–140)
HCT VFR BLD AUTO: 36.8 % (ref 37–47)
HGB BLD-MCNC: 11.9 G/DL (ref 12–16)
LYMPHOCYTES # BLD AUTO: 0.36 THOUSAND/UL (ref 0.6–4.47)
LYMPHOCYTES # BLD AUTO: 2 %
MAGNESIUM SERPL-MCNC: 2.2 MG/DL (ref 1.6–2.6)
MCH RBC QN AUTO: 31.9 PG (ref 27–31)
MCHC RBC AUTO-ENTMCNC: 32.3 G/DL (ref 31.4–37.4)
MCV RBC AUTO: 99 FL (ref 82–98)
MONOCYTES # BLD AUTO: 0.54 THOUSAND/UL (ref 0–1.22)
MONOCYTES NFR BLD AUTO: 3 % (ref 4–12)
MRSA NOSE QL CULT: NORMAL
NEUTS BAND NFR BLD MANUAL: 20 % (ref 0–8)
NEUTS SEG # BLD: 17.01 THOUSAND/UL (ref 1.81–6.82)
NEUTS SEG NFR BLD AUTO: 75 %
NRBC BLD AUTO-RTO: 0 /100 WBCS
P AXIS: 77 DEGREES
PLATELET # BLD AUTO: 205 THOUSANDS/UL (ref 130–400)
PLATELET BLD QL SMEAR: ADEQUATE
PMV BLD AUTO: 8.8 FL (ref 8.9–12.7)
POTASSIUM SERPL-SCNC: 3.2 MMOL/L (ref 3.5–5.3)
PR INTERVAL: 182 MS
QRS AXIS: 6 DEGREES
QRSD INTERVAL: 146 MS
QT INTERVAL: 436 MS
QTC INTERVAL: 539 MS
RBC # BLD AUTO: 3.73 MILLION/UL (ref 4.2–5.4)
SODIUM SERPL-SCNC: 140 MMOL/L (ref 136–145)
T WAVE AXIS: 153 DEGREES
TOTAL CELLS COUNTED SPEC: 100
VENTRICULAR RATE: 92 BPM
WBC # BLD AUTO: 17.9 THOUSAND/UL (ref 4.8–10.8)

## 2017-11-13 PROCEDURE — 83735 ASSAY OF MAGNESIUM: CPT | Performed by: INTERNAL MEDICINE

## 2017-11-13 PROCEDURE — G8979 MOBILITY GOAL STATUS: HCPCS

## 2017-11-13 PROCEDURE — 97163 PT EVAL HIGH COMPLEX 45 MIN: CPT

## 2017-11-13 PROCEDURE — G8988 SELF CARE GOAL STATUS: HCPCS

## 2017-11-13 PROCEDURE — 80048 BASIC METABOLIC PNL TOTAL CA: CPT | Performed by: INTERNAL MEDICINE

## 2017-11-13 PROCEDURE — 85007 BL SMEAR W/DIFF WBC COUNT: CPT | Performed by: INTERNAL MEDICINE

## 2017-11-13 PROCEDURE — 94760 N-INVAS EAR/PLS OXIMETRY 1: CPT

## 2017-11-13 PROCEDURE — 85027 COMPLETE CBC AUTOMATED: CPT | Performed by: INTERNAL MEDICINE

## 2017-11-13 PROCEDURE — 97167 OT EVAL HIGH COMPLEX 60 MIN: CPT

## 2017-11-13 PROCEDURE — G8987 SELF CARE CURRENT STATUS: HCPCS

## 2017-11-13 PROCEDURE — 94640 AIRWAY INHALATION TREATMENT: CPT

## 2017-11-13 PROCEDURE — G8978 MOBILITY CURRENT STATUS: HCPCS

## 2017-11-13 RX ORDER — METHYLPREDNISOLONE SODIUM SUCCINATE 40 MG/ML
20 INJECTION, POWDER, LYOPHILIZED, FOR SOLUTION INTRAMUSCULAR; INTRAVENOUS EVERY 8 HOURS SCHEDULED
Status: DISCONTINUED | OUTPATIENT
Start: 2017-11-13 | End: 2017-11-16 | Stop reason: HOSPADM

## 2017-11-13 RX ORDER — POTASSIUM CHLORIDE 20 MEQ/1
40 TABLET, EXTENDED RELEASE ORAL
Status: COMPLETED | OUTPATIENT
Start: 2017-11-13 | End: 2017-11-13

## 2017-11-13 RX ADMIN — CITALOPRAM HYDROBROMIDE 10 MG: 20 TABLET ORAL at 09:14

## 2017-11-13 RX ADMIN — IPRATROPIUM BROMIDE AND ALBUTEROL SULFATE 3 ML: .5; 3 SOLUTION RESPIRATORY (INHALATION) at 02:55

## 2017-11-13 RX ADMIN — IPRATROPIUM BROMIDE AND ALBUTEROL SULFATE 3 ML: .5; 3 SOLUTION RESPIRATORY (INHALATION) at 15:55

## 2017-11-13 RX ADMIN — PIPERACILLIN SODIUM,TAZOBACTAM SODIUM 3.38 G: 3; .375 INJECTION, POWDER, FOR SOLUTION INTRAVENOUS at 13:06

## 2017-11-13 RX ADMIN — HEPARIN SODIUM 5000 UNITS: 5000 INJECTION, SOLUTION INTRAVENOUS; SUBCUTANEOUS at 05:42

## 2017-11-13 RX ADMIN — HEPARIN SODIUM 5000 UNITS: 5000 INJECTION, SOLUTION INTRAVENOUS; SUBCUTANEOUS at 13:08

## 2017-11-13 RX ADMIN — PIPERACILLIN SODIUM,TAZOBACTAM SODIUM 3.38 G: 3; .375 INJECTION, POWDER, FOR SOLUTION INTRAVENOUS at 01:49

## 2017-11-13 RX ADMIN — POTASSIUM CHLORIDE 40 MEQ: 1500 TABLET, EXTENDED RELEASE ORAL at 09:13

## 2017-11-13 RX ADMIN — POTASSIUM CHLORIDE 40 MEQ: 1500 TABLET, EXTENDED RELEASE ORAL at 11:34

## 2017-11-13 RX ADMIN — PIPERACILLIN SODIUM,TAZOBACTAM SODIUM 3.38 G: 3; .375 INJECTION, POWDER, FOR SOLUTION INTRAVENOUS at 06:00

## 2017-11-13 RX ADMIN — ATORVASTATIN CALCIUM 40 MG: 40 TABLET, FILM COATED ORAL at 18:34

## 2017-11-13 RX ADMIN — IPRATROPIUM BROMIDE AND ALBUTEROL SULFATE 3 ML: .5; 3 SOLUTION RESPIRATORY (INHALATION) at 08:16

## 2017-11-13 RX ADMIN — METHYLPREDNISOLONE SODIUM SUCCINATE 40 MG: 40 INJECTION, POWDER, FOR SOLUTION INTRAMUSCULAR; INTRAVENOUS at 11:34

## 2017-11-13 RX ADMIN — IPRATROPIUM BROMIDE AND ALBUTEROL SULFATE 3 ML: .5; 3 SOLUTION RESPIRATORY (INHALATION) at 20:12

## 2017-11-13 RX ADMIN — HEPARIN SODIUM 5000 UNITS: 5000 INJECTION, SOLUTION INTRAVENOUS; SUBCUTANEOUS at 21:21

## 2017-11-13 RX ADMIN — PIPERACILLIN SODIUM,TAZOBACTAM SODIUM 3.38 G: 3; .375 INJECTION, POWDER, FOR SOLUTION INTRAVENOUS at 18:34

## 2017-11-13 RX ADMIN — ASPIRIN 81 MG 81 MG: 81 TABLET ORAL at 09:13

## 2017-11-13 RX ADMIN — METHYLPREDNISOLONE SODIUM SUCCINATE 40 MG: 40 INJECTION, POWDER, FOR SOLUTION INTRAMUSCULAR; INTRAVENOUS at 03:18

## 2017-11-13 RX ADMIN — METHYLPREDNISOLONE SODIUM SUCCINATE 20 MG: 40 INJECTION, POWDER, FOR SOLUTION INTRAMUSCULAR; INTRAVENOUS at 21:21

## 2017-11-13 RX ADMIN — BUDESONIDE 0.5 MG: 0.5 INHALANT RESPIRATORY (INHALATION) at 08:16

## 2017-11-13 NOTE — SOCIAL WORK
SPOKE WITH PT AT THE BEDSIDE  PT STATED THAT SHE IS FROM [facility] AND HER PLAN WOULD BE TO RETURN TO THERE  PT DOES HAVE A WALKER THERE THAT SHE USES BUT NO OTHER EQUIP  OR SERVICES  PT STATED THAT HER DTR DRIVES WHEN SHE NEEDS TO GO SOMEWHERE  NO DCP NEEDS NOTED AT THIS TIME

## 2017-11-13 NOTE — PLAN OF CARE
Problem: DISCHARGE PLANNING - CARE MANAGEMENT  Goal: Discharge to post-acute care or home with appropriate resources  INTERVENTIONS:  - Conduct assessment to determine patient/family and health care team treatment goals, and need for post-acute services based on payer coverage, community resources, and patient preferences, and barriers to discharge  - Address psychosocial, clinical, and financial barriers to discharge as identified in assessment in conjunction with the patient/family and health care team  - Arrange appropriate level of post-acute services according to patient's   needs and preference and payer coverage in collaboration with the physician and health care team  - Communicate with and update the patient/family, physician, and health care team regarding progress on the discharge plan  - Arrange appropriate transportation to post-acute venues  [address] [place] [address]     Outcome: Progressing

## 2017-11-13 NOTE — OCCUPATIONAL THERAPY NOTE
OT EVALUATION   11/13/17 1025   Note Type   Note type Eval only   Restrictions/Precautions   Other Precautions Fall Risk;O2  (flat affect)   Pain Assessment   Pain Assessment Lange-Baker FACES   Lange-Baker FACES Pain Rating 0   Home Living   Type of Home Assisted living  ([facility])   Home Layout One level;Ramped entrance   Bathroom Shower/Tub Walk-in shower   Bathroom Toilet Standard   Bathroom Equipment Shower chair   Bathroom Accessibility Accessible via walker   Home Equipment (roller walker)   Prior Function   Level of [place] Needs assistance with IADLs; Needs assistance with ADLs and functional mobility   Lives With Facility staff   Receives Help From Personal care attendant   ADL Assistance Needs assistance   IADLs Needs assistance   Falls in the last 6 months 1 to 4  (as per patient)   Vocational Retired  (from [employer])   Subjective   Subjective none stated; pt presents with very flat effect    ADL   Where Assessed Chair   Eating Assistance 5  Supervision/Setup   Grooming Assistance 4  Minimal Assistance   UB Bathing Assistance 4  Minimal Assistance   LB Bathing Assistance 3  Moderate Assistance   UB Dressing Assistance 4  Minimal Assistance   LB Dressing Assistance 3  Moderate Assistance   [address]  4  Minimal Assistance   Functional Assistance 4  Minimal Assistance   Bed Mobility   Supine to Sit 3  Moderate assistance   Transfers   Sit to Stand 4  Minimal assistance   Stand to Sit 4  Minimal assistance   Functional Mobility   Functional Mobility 4  Minimal assistance   Additional items Rolling walker  (10 ft x 2 to/from doorway)   Balance   Static Sitting Fair   Dynamic Sitting [facility] -   Dynamic Standing Poor +   Ambulatory Poor +   Activity Tolerance   Activity Tolerance Patient limited by fatigue   RUE Assessment   RUE Assessment WFL   LUE Assessment   LUE Assessment WFL   Hand Function   Gross Motor Coordination Functional   Fine Motor Coordination Functional   Cognition   Overall Cognitive Status WFL   Arousal/Participation Cooperative;Poorly responsive   Attention Attends with cues to redirect   Orientation Level Oriented X4   Following Commands Follows one step commands with increased time or repetition   Comments flat affect, does not always respond   Assessment   Limitation Decreased ADL status; Decreased endurance;Decreased self-care trans;Decreased high-level ADLs   Prognosis Good   Assessment Evaluation compiled via occupational profile, medical chart review and clinical observation of ADLs, transfers and mobility  Pt admitted with SOB and fever  PTA, pt is a resident from [address] [place] [address] and reports ambulating with a RW and receiving daily assist for ADLs/IADLs  She reports having a recent fall but gives no further information  Today, pt presenting with general fatigue/weakness and is requiring min A for transfers/mobility and mod A for ADLs  OT to intervene to address above functional deficits and assist with safe return to [facility]  Co morbidities affecting functional outcome include COPD, ETOH abuse and depression  Based on co morbidities and PLOF in comparison to today's functional performance, this evaluation is considered a highly complex level of clinical decision making  The Barthel Index is used as a functional outcome measure with a score today of 40 which suggests marked functional limitations  Goals   Patient Goals none stated   STG Time Frame (1-7 days)   Short Term Goal #1 Patient will perform functional mobility to and from bathroom with rolling walker and close supervision, in order to increase stamina to tolerate ADL's; Patient will stand at sink x 3 minutes and perform ADL activities, without signs of fatigue, in order to increase stamina to return to prior level of function; Patient will tolerate 5 minutes of UE strengthening seated OOB, without signs of fatigue, in order to increase stamina to tolerate ADL's     LTG Time Frame (8-14 days)   Long Term Goal #1 Patient will perform functional mobility to and from bathroom with rolling walker and distant supervision, in order to increase stamina to tolerate ADL's; Patient will stand at sink x 6 minutes and perform ADL activities, without signs of fatigue, in order to increase stamina to return to prior level of function; Patient will tolerate 10 minutes of UE strengthening seated OOB, without signs of fatigue, in order to increase stamina to tolerate ADL's  Functional Transfer Goals   Pt Will Perform All Functional Transfers (STG: close supervision, LTG: distant supervision)   ADL Goals   Pt Will Perform Eating In chair; At edge of bed  (STG: setup, LTG: independent)   Pt Will Perform Grooming Standing at sink  (STG: close supervision, LTG: distant supervision)   Pt Will Perform Bathing In shower/tub seat  (STG: close supervision, LTG: distant supervision)   Pt Will Perform UE Dressing In chair; At edge of bed  (STG: supervision, LTG: independent)   Pt Will Perform LE Dressing In chair; At edge of bed  (STG: min A, LTG: supervision)   Pt Will Perform Toileting (STG: supervision, LTG: independent)   Plan   Treatment Interventions ADL retraining;Functional transfer training;UE strengthening/ROM; Endurance training;Patient/family training;Equipment evaluation/education; Compensatory technique education; Energy conservation   OT Frequency 3-5x/wk   Recommendation   OT Discharge Recommendation ([facility] with PT/OT)   Barthel Index   Feeding 5   Bathing 0   Grooming Score 0   Dressing Score 5   Bladder Score 5   Bowels Score 5   Toilet Use Score 5   Transfers (Bed/Chair) Score 10   Mobility (Level Surface) Score 0   Stairs Score 5   Barthel Index Score 40

## 2017-11-13 NOTE — CASE MANAGEMENT
Initial Clinical Review    Admission: Date/Time/Statement: 11/11/17 @ 2203     Orders Placed This Encounter   Procedures    Inpatient Admission (expected length of stay for this patient is greater than two midnights)     Standing Status:   Standing     Number of Occurrences:   1     Order Specific Question:   Admitting Physician     Answer:   Branden Cutler [[id number]]     Order Specific Question:   Level of Care     Answer:   Med Surg [16]     Order Specific Question:   Estimated length of stay     Answer:   More than 2 Midnights     Order Specific Question:   Certification     Answer:   I certify that inpatient services are medically necessary for this patient for a duration of greater than two midnights  See H&P and MD Progress Notes for additional information about the patient's course of treatment  ED: Date/Time/Mode of Arrival:   ED Arrival Information     Expected Arrival Acuity Means of Arrival Escorted By Service Admission Type    - 11/11/2017 18:22 Urgent Ambulance [facility] Urgent    Arrival Complaint    Fever, SOB      Chief Complaint:   Chief Complaint   Patient presents with    Shortness of Breath     [facility]  pt was foudn with an 02 of 84% RA via EMS  Pt presents to the ed, with decreased breath sounds  Pt is alert x3, unaware of why she is at the hospital     History of Illness:   [facility] presents [facility] for dyspnea with hypoxia on RA  Patient denies any complaints on exam  Answers questions approipaitely but affect flat and answers short    ED Vital Signs:   ED Triage Vitals   Temperature Pulse Respirations Blood Pressure SpO2   11/11/17 1835 11/11/17 1835 11/11/17 1835 11/11/17 1835 11/11/17 1835   98 8 °F (37 1 °C) 89 16 (!) 182/95 (!) 86 %      Temp Source Heart Rate Source Patient Position - Orthostatic VS BP Location FiO2 (%)   11/11/17 1835 11/11/17 1835 11/11/17 1835 11/11/17 1835 --   Tympanic Monitor Lying Right arm       Pain Score 11/11/17 1845       No Pain        Wt Readings from Last 1 Encounters:   11/13/17 57 1 kg (125 lb 14 1 oz)   Vital Signs (abnormal): Abnormal Labs/Diagnostic Test Results:   BANDS 31 K 2 8 CL 99 CR 0 57 ALB 3 3 MG 1 5   ECG 12 Lead Documentation  Date/Time: 11/11/2017 9:41 PM  Performed by: Dina Anderson by: Pedro SEYMOUR   ECG reviewed by me, the ED Provider: yes    Patient location:  ED  Previous ECG:     Previous ECG:  Unavailable  Interpretation:     Interpretation: abnormal    Rate:     ECG rate:  92    ECG rate assessment: normal    Rhythm:     Rhythm: sinus rhythm    Ectopy:     Ectopy: none    Conduction:     Conduction: abnormal      Abnormal conduction: complete LBBB    ST segments:     ST segments:  Normal  T waves:     T waves: normal    CXR= No active pulmonary disease on examination which is somewhat limited secondary to low lung volumes  CTA CHEST= 1  No evidence of pulmonary embolism  2   Patchy opacity in the right lower lobe may be related to atelectasis or infiltrate  Bronchial thickening in the right lower lobe  Mild interlobular septal thickening, mild CHF is not excluded  3   Single prominent subcarinal lymph node, nonspecific finding  Consider continued follow-up  4   Mildly aneurysmal abdominal aorta   gallstones    ED Treatment:   Medication Administration from 11/11/2017 1822 to 11/11/2017 2239       Date/Time Order Dose Route Action Action by Comments     11/11/2017 1859 sodium chloride 0 9 % bolus 1,000 mL 1,000 mL Intravenous Robinet 37 Jaylin Fernández RN      11/11/2017 1845 ipratropium-albuterol (DUO-NEB) 0 5-2 5 mg/mL inhalation solution 3 mL 3 mL Nebulization Given Jory Parry RN      11/11/2017 1902 methylPREDNISolone sodium succinate (Solu-MEDROL) injection 125 mg 125 mg Intravenous Given Jaylin Fernández RN      11/11/2017 1845 acetaminophen (TYLENOL) tablet 650 mg 650 mg Oral Given Jory Parry RN      11/11/2017 2032 levofloxacin (LEVAQUIN) IVPB (premix) 750 mg 0 mg Intravenous Stopped Kacie Burgos RN      11/11/2017 1902 levofloxacin (LEVAQUIN) IVPB (premix) 750 mg 750 mg Intravenous Krishan 37 Kacie Burgos RN      11/11/2017 2002 potassium chloride (K-DUR,KLOR-CON) CR tablet 40 mEq 40 mEq Oral Given Kacie Burgos RN      11/11/2017 2057 iohexol (OMNIPAQUE) 350 MG/ML injection (MULTI-DOSE) 100 mL 85 mL Intravenous Given Sreekanth Jeffers       Past Medical/Surgical History: Active Ambulatory Problems     Diagnosis Date Noted    No Active Ambulatory Problems     Resolved Ambulatory Problems     Diagnosis Date Noted    No Resolved Ambulatory Problems     Past Medical History:   Diagnosis Date    Constipation     Depression     ETOH abuse    Admitting Diagnosis: Shortness of breath [R06 02]  Pneumonia [J18 9]  COPD exacerbation (HCC) [J44 1]  Age/Sex: 80 y o  female  Assessment/Plan:   Lorri Guido is a 80 y o  female with a PMH of HLD and Depression who presents with fever and shortness of breath  Acute Respiratory Failure secondary to HCAP and COPD Exacerbation  - CT reveals no PE but an infiltrate in right lung  She was given Solumedrol, Duonebs and Levaquin in the ED  Influenza test pending  Will admit to Medicine, broaden coverage with Vancomycin and Zosyn for now as she is a NH resident with plan to deescalate accordingly, order urine strep/legionella and start on scheduled Solumedrol and Duonebs along with Albuterol prn   Hypokalemia  - replaced in the ED  Will order Mag level and follow up K in several hours   LBBB  - no prior EKG available to review  Contacted NH and they as well do not have any prior EKG or documentation in chart regarding this  Contacted her daughter and she states that she is unaware of any h/o CAD or LBBB  She also reports that she has a h/o AS but refused any intervention a year ago  At this time, she is not having any active chest pain and is vitally stable  Her initial troponin is WNL    Will place on telemetry, trend troponin and resume Asprin and Statin  Advised daughter to bring in Cardiologist contact info tomorrow  Prominent Subcarinal Lymph Node seen on CT imaging  - will require follow up imaging   HLD  - continue Statin  Depression  - continue [facility] Problem List:   Principal Problem:    Acute respiratory failure ([facility] )  Active Problems:    HCAP (healthcare-associated pneumonia)    COPD exacerbation ([facility] )    HLD (hyperlipidemia)    Hypokalemia   Anticipated Length of Stay:  Patient will be admitted on an Inpatient basis with an anticipated length of stay of atleast 2 midnights     Admission Orders:  TELEMETRY  PT/OT EVAL & TX  DROPLET ISOLATION  Scheduled Meds:   aspirin 81 mg Oral Daily   atorvastatin 40 mg Oral Daily With Dinner   brimonidine-timolol 1 drop Both Eyes Q12H [address]   budesonide 0 5 mg Nebulization Daily   citalopram 10 mg Oral Daily   heparin (porcine) 5,000 Units Subcutaneous Q8H [address]   ipratropium-albuterol 3 mL Nebulization Q6H   methylPREDNISolone sodium succinate 40 mg Intravenous Q8H   piperacillin-tazobactam (ZOSYN) 3 375 g in dextrose 5% 100 mL IVPB 3 375 g Intravenous Q6H     Continuous Infusions:    PRN Meds:   acetaminophen    albuterol

## 2017-11-13 NOTE — PHYSICAL THERAPY NOTE
PT EVALUATION       11/13/17 1310   Pain Assessment   Pain Assessment No/denies pain   Home Living   Type of Home Assisted living  ([facility])   Home Layout One level   Home Equipment (rolling walker)   Prior Function   Level of [place] Needs assistance with ADLs and functional mobility   Lives With Facility staff   ADL Assistance Needs assistance   Restrictions/Precautions   Other Precautions Fall Risk;O2;Bed Alarm; Chair Alarm   General   Additional Pertinent History pt admitted with SOB/PNA/COPD  Cognition   Overall Cognitive Status WFL   Orientation Level Oriented to person;Oriented to place;Oriented to time   Following Commands Follows one step commands with increased time or repetition   Comments flat affect   RLE Assessment   RLE Assessment (ROM WFL, MMT 44/5)   LLE Assessment   LLE Assessment (ROM WFL, MMT 4/5)   Bed Mobility   Sit to Supine 4  Minimal assistance   Transfers   Sit to Stand 4  Minimal assistance   Stand to Sit 4  Minimal assistance   Stand pivot 4  Minimal assistance   Ambulation/Elevation   Gait pattern Short stride; Foward flexed; Inconsistent abbie  (pt is shaky)   Gait Assistance 4  Minimal assist   Assistive Device Rolling walker  (3L02)   Distance 50 feet   Balance   Static Sitting Fair   Dynamic Sitting Fair   Static Standing Fair   Dynamic Standing Poor   Endurance Deficit   Endurance Deficit (Sp02 95% on 3L02 after activity)   Assessment   Prognosis Good   Problem List Decreased strength;Decreased endurance; Impaired balance;Decreased mobility   Assessment Patient seen for Physical Therapy evaluation  Patient admitted with Acute respiratory failure ([facility] )  Comorbidities affecting patient's physical performance include: COPD, etoh abuse  Personal factors affecting patient at time of initial evaluation include: ambulating with assistive device, decreased initiation and engagement and depression  Prior to admission, patient was ambulatory with rolling walker    Please find objective findings from Physical Therapy assessment regarding body systems outlined above with impairments and limitations including weakness, impaired balance, decreased endurance, gait deviations, decreased activity tolerance, decreased functional mobility tolerance and fall risk  The Barthel Index was used as a functional outcome tool presenting with a score of 40 today indicating marked limitations of functional mobility and ADLS  Patient's clinical presentation is currently unstable/unpredictable as seen in patient's presentation of increased fall risk, new onset of impairment of functional mobility, decreased endurance and new onset of weakness  Pt would benefit from continued Physical Therapy treatment to address deficits as defined above and maximize level of functional mobility  As demonstrated by objective findings, the assigned level of complexity for this evaluation is high  Goals   Patient Goals "go back to bed"   STG Expiration Date (1-7 days)   Short Term Goal #1 supervision bed mobility, supervision transfers, min assist ambulation with walker 100 feet   LTG Expiration Date (1-2 weeks)   Long Term Goal #1 independent bed mobility, independent transfers, improve standing static balance to good, pt will ambulate 100 feet with supervision with minimal SOB  Plan   Treatment/Interventions ADL retraining;Functional transfer training;LE strengthening/ROM; Therapeutic exercise; Endurance training;Equipment eval/education;Patient/family training;Gait training;Bed mobility   PT Frequency (3-5x/week)   Recommendation   Recommendation (return to [facility] with home PT)   Equipment Recommended (pt has a walker)   Barthel Index   Feeding 5   Bathing 0   Grooming Score 0   Dressing Score 5   Bladder Score 5   Bowels Score 5   Toilet Use Score 5   Transfers (Bed/Chair) Score 10   Mobility (Level Surface) Score 0   Stairs Score 5   Barthel Index Score 40

## 2017-11-14 LAB
ACANTHOCYTES BLD QL SMEAR: PRESENT
ANION GAP SERPL CALCULATED.3IONS-SCNC: 10 MMOL/L (ref 4–13)
BASOPHILS # BLD AUTO: 0 THOUSANDS/ΜL (ref 0–0.1)
BASOPHILS NFR BLD AUTO: 0 % (ref 0–1)
BUN SERPL-MCNC: 28 MG/DL (ref 5–25)
CALCIUM SERPL-MCNC: 8 MG/DL (ref 8.3–10.1)
CHLORIDE SERPL-SCNC: 106 MMOL/L (ref 100–108)
CO2 SERPL-SCNC: 26 MMOL/L (ref 21–32)
CREAT SERPL-MCNC: 0.65 MG/DL (ref 0.6–1.3)
EOSINOPHIL # BLD AUTO: 0 THOUSAND/ΜL (ref 0–0.61)
EOSINOPHIL NFR BLD AUTO: 0 % (ref 0–6)
ERYTHROCYTE [DISTWIDTH] IN BLOOD BY AUTOMATED COUNT: 14.9 % (ref 11.6–15.1)
GFR SERPL CREATININE-BSD FRML MDRD: 83 ML/MIN/1.73SQ M
GLUCOSE SERPL-MCNC: 149 MG/DL (ref 65–140)
HCT VFR BLD AUTO: 33.6 % (ref 37–47)
HGB BLD-MCNC: 10.9 G/DL (ref 12–16)
LYMPHOCYTES # BLD AUTO: 0.3 THOUSANDS/ΜL (ref 0.6–4.47)
LYMPHOCYTES NFR BLD AUTO: 2 % (ref 14–44)
MACROCYTES BLD QL AUTO: PRESENT
MAGNESIUM SERPL-MCNC: 2.1 MG/DL (ref 1.6–2.6)
MCH RBC QN AUTO: 31.7 PG (ref 27–31)
MCHC RBC AUTO-ENTMCNC: 32.4 G/DL (ref 31.4–37.4)
MCV RBC AUTO: 98 FL (ref 82–98)
MONOCYTES # BLD AUTO: 0.4 THOUSAND/ΜL (ref 0.17–1.22)
MONOCYTES NFR BLD AUTO: 2 % (ref 4–12)
NEUTROPHILS # BLD AUTO: 14.9 THOUSANDS/ΜL (ref 1.85–7.62)
NEUTS SEG NFR BLD AUTO: 96 % (ref 43–75)
NRBC BLD AUTO-RTO: 0 /100 WBCS
PLATELET # BLD AUTO: 193 THOUSANDS/UL (ref 130–400)
PLATELET BLD QL SMEAR: ADEQUATE
PMV BLD AUTO: 8.9 FL (ref 8.9–12.7)
POTASSIUM SERPL-SCNC: 4.4 MMOL/L (ref 3.5–5.3)
RBC # BLD AUTO: 3.44 MILLION/UL (ref 4.2–5.4)
SODIUM SERPL-SCNC: 142 MMOL/L (ref 136–145)
WBC # BLD AUTO: 15.6 THOUSAND/UL (ref 4.8–10.8)

## 2017-11-14 PROCEDURE — 90670 PCV13 VACCINE IM: CPT | Performed by: FAMILY MEDICINE

## 2017-11-14 PROCEDURE — 83735 ASSAY OF MAGNESIUM: CPT | Performed by: INTERNAL MEDICINE

## 2017-11-14 PROCEDURE — 94760 N-INVAS EAR/PLS OXIMETRY 1: CPT

## 2017-11-14 PROCEDURE — 97110 THERAPEUTIC EXERCISES: CPT

## 2017-11-14 PROCEDURE — G0009 ADMIN PNEUMOCOCCAL VACCINE: HCPCS | Performed by: FAMILY MEDICINE

## 2017-11-14 PROCEDURE — 80048 BASIC METABOLIC PNL TOTAL CA: CPT | Performed by: INTERNAL MEDICINE

## 2017-11-14 PROCEDURE — 85025 COMPLETE CBC W/AUTO DIFF WBC: CPT | Performed by: INTERNAL MEDICINE

## 2017-11-14 PROCEDURE — 94640 AIRWAY INHALATION TREATMENT: CPT

## 2017-11-14 RX ADMIN — PIPERACILLIN SODIUM,TAZOBACTAM SODIUM 3.38 G: 3; .375 INJECTION, POWDER, FOR SOLUTION INTRAVENOUS at 13:16

## 2017-11-14 RX ADMIN — ATORVASTATIN CALCIUM 40 MG: 40 TABLET, FILM COATED ORAL at 16:11

## 2017-11-14 RX ADMIN — IPRATROPIUM BROMIDE AND ALBUTEROL SULFATE 3 ML: .5; 3 SOLUTION RESPIRATORY (INHALATION) at 08:19

## 2017-11-14 RX ADMIN — METHYLPREDNISOLONE SODIUM SUCCINATE 20 MG: 40 INJECTION, POWDER, FOR SOLUTION INTRAMUSCULAR; INTRAVENOUS at 13:16

## 2017-11-14 RX ADMIN — METHYLPREDNISOLONE SODIUM SUCCINATE 20 MG: 40 INJECTION, POWDER, FOR SOLUTION INTRAMUSCULAR; INTRAVENOUS at 06:20

## 2017-11-14 RX ADMIN — PIPERACILLIN SODIUM,TAZOBACTAM SODIUM 3.38 G: 3; .375 INJECTION, POWDER, FOR SOLUTION INTRAVENOUS at 00:03

## 2017-11-14 RX ADMIN — PIPERACILLIN SODIUM,TAZOBACTAM SODIUM 3.38 G: 3; .375 INJECTION, POWDER, FOR SOLUTION INTRAVENOUS at 18:29

## 2017-11-14 RX ADMIN — CITALOPRAM HYDROBROMIDE 10 MG: 20 TABLET ORAL at 09:06

## 2017-11-14 RX ADMIN — HEPARIN SODIUM 5000 UNITS: 5000 INJECTION, SOLUTION INTRAVENOUS; SUBCUTANEOUS at 22:01

## 2017-11-14 RX ADMIN — HEPARIN SODIUM 5000 UNITS: 5000 INJECTION, SOLUTION INTRAVENOUS; SUBCUTANEOUS at 13:16

## 2017-11-14 RX ADMIN — PIPERACILLIN SODIUM,TAZOBACTAM SODIUM 3.38 G: 3; .375 INJECTION, POWDER, FOR SOLUTION INTRAVENOUS at 06:25

## 2017-11-14 RX ADMIN — PNEUMOCOCCAL 13-VALENT CONJUGATE VACCINE 0.5 ML: 2.2; 2.2; 2.2; 2.2; 2.2; 4.4; 2.2; 2.2; 2.2; 2.2; 2.2; 2.2; 2.2 INJECTION, SUSPENSION INTRAMUSCULAR at 06:21

## 2017-11-14 RX ADMIN — BUDESONIDE 0.5 MG: 0.5 INHALANT RESPIRATORY (INHALATION) at 08:22

## 2017-11-14 RX ADMIN — IPRATROPIUM BROMIDE AND ALBUTEROL SULFATE 3 ML: .5; 3 SOLUTION RESPIRATORY (INHALATION) at 02:13

## 2017-11-14 RX ADMIN — ASPIRIN 81 MG 81 MG: 81 TABLET ORAL at 09:06

## 2017-11-14 RX ADMIN — IPRATROPIUM BROMIDE AND ALBUTEROL SULFATE 3 ML: .5; 3 SOLUTION RESPIRATORY (INHALATION) at 18:48

## 2017-11-14 RX ADMIN — HEPARIN SODIUM 5000 UNITS: 5000 INJECTION, SOLUTION INTRAVENOUS; SUBCUTANEOUS at 06:20

## 2017-11-14 RX ADMIN — IPRATROPIUM BROMIDE AND ALBUTEROL SULFATE 3 ML: .5; 3 SOLUTION RESPIRATORY (INHALATION) at 13:31

## 2017-11-14 RX ADMIN — METHYLPREDNISOLONE SODIUM SUCCINATE 20 MG: 40 INJECTION, POWDER, FOR SOLUTION INTRAMUSCULAR; INTRAVENOUS at 22:01

## 2017-11-14 NOTE — PLAN OF CARE
Problem: RESPIRATORY - ADULT  Goal: Achieves optimal ventilation and oxygenation  INTERVENTIONS:  - Assess for changes in respiratory status  - Assess for changes in mentation and behavior  - Position to facilitate oxygenation and minimize respiratory effort  - Oxygen administration by appropriate delivery method based on oxygen saturation (per order) or ABGs  - Initiate smoking cessation education as indicated  - Encourage broncho-pulmonary hygiene including cough, deep breathe, Incentive Spirometry  - Assess the need for suctioning and aspirate as needed  - Assess and instruct to report SOB or any respiratory difficulty  - Respiratory Therapy support as indicated    Outcome: Progressing  Pt continues on oxygen and neb tx will update on 11/17

## 2017-11-14 NOTE — PROGRESS NOTES
[facility] Internal Medicine Progress Note  Patient: Edwin Villa 80 y o  female   MRN: [id number]  PCP: SHERYL Hansen  Unit/Bed#: [address] [place] [address] Encounter: [id number]  Date Of Visit: 11/13/17    Problem List:    Principal Problem:    Acute respiratory failure ([facility] )  Active Problems:    HCAP (healthcare-associated pneumonia)    COPD exacerbation ([facility] )    HLD (hyperlipidemia)    Hypokalemia      Assessment & Plan:    1  Sepsis, POA secondary to right lower lobe pneumonia - afebrile at present  Noted with leukocytosis with bandemia  But clinically appears to be improving  Urine Legionella and pneumococcal antigen is negative  Influenza PCR negative  Unable to provide sputum  MRSA surveillance is negative  Continue  IV Zosyn  Discontinue vancomycin  Blood cultures are negative  2  COPD exacerbation secondary to 1   -continue bronchodilators, decrease IV steroid to 20 q 8h  3   Acute hypoxic respiratory failure secondary to above-continue supplemental oxygen and taper as tolerated  4  Hypokalemia-replete and monitor  5  Abnormal EKG, LBBB-likely old  Troponin negative  no arrhythmia on telemetry  6  History of aortic stenosis  7  Prominent  subcarinal lymph node likely reactive-secondary to 1  Monitor  8  Dyslipidemia-on Lipitor  9  Depression-on Celexa      VTE Pharmacologic Prophylaxis:   Pharmacologic: Heparin  Mechanical VTE Prophylaxis in Place: Yes    Patient Centered Rounds: I have performed bedside rounds with nursing staff today  Discussions with Specialists or Other Care Team Provider: Yes,     Education and Discussions with Family / Patient:Yes    Time Spent for Care: 45 minutes  More than 50% of total time spent on counseling and coordination of care as described above      Current Length of Stay: 2 day(s)    Current Patient Status: Inpatient , continues to require IV antibiotics and follow-up CBC for leukocytosis and bandemia    Discharge Plan:  Back to [facility] when improved    Code Status: Level 1 - Full Code      Subjective:   Reports that she feels better and denies any shortness of breath  She reports that her cough is improving  She denies any  or GI symptoms      Objective:   Not in distress    Limited secondary to underlying psychiatric illness    Vitals:   Temp (24hrs), Av 5 °F (36 9 °C), Min:98 °F (36 7 °C), Max:99 2 °F (37 3 °C)    HR:  [79-96] 83  Resp:  [16-20] 18  BP: (110-137)/(52-76) 137/76  SpO2:  [96 %-98 %] 96 %  Body mass index is 23 02 kg/m²  Input and Output Summary (last 24 hours): Intake/Output Summary (Last 24 hours) at 17 6239  Last data filed at 17 1801   Gross per 24 hour   Intake                0 ml   Output              100 ml   Net             -100 ml       Physical Exam:     Physical Exam   Constitutional: She appears well-developed  No distress  HENT:   Head: Normocephalic and atraumatic  Eyes: Conjunctivae and EOM are normal  Pupils are equal, round, and reactive to light  Neck: Normal range of motion  Neck supple  No JVD present  Cardiovascular: Normal rate and regular rhythm  Exam reveals no gallop and no friction rub  Murmur heard  Pulmonary/Chest: Effort normal  No respiratory distress  She has decreased breath sounds  She has no wheezes  She has no rhonchi  She has no rales  She exhibits no tenderness  Abdominal: Soft  Bowel sounds are normal  She exhibits no distension  There is no tenderness  There is no rebound and no guarding  Musculoskeletal: She exhibits no edema  Neurological: She is alert  She is not disoriented  No cranial nerve deficit or sensory deficit  GCS eye subscore is 4  GCS verbal subscore is 5  GCS motor subscore is 6  Skin: Skin is warm and dry  No rash noted  Psychiatric: Her affect is blunt  Cognition and memory are impaired         Additional Data:     Labs:      Results from last 7 days  Lab Units 17  0557   WBC Thousand/uL 17 90*   HEMOGLOBIN g/dL 11 9* HEMATOCRIT % 36 8*   PLATELETS Thousands/uL 205   LYMPHO PCT % 2   MONO PCT MAN % 3*       Results from last 7 days  Lab Units 11/13/17  0557  11/11/17  1857   SODIUM mmol/L 140  < > 139   POTASSIUM mmol/L 3 2*  < > 2 8*   CHLORIDE mmol/L 105  < > 99*   CO2 mmol/L 23  < > 31   BUN mg/dL 23  < > 14   CREATININE mg/dL 0 86  < > 0 57*   CALCIUM mg/dL 8 2*  < > 8 4   TOTAL PROTEIN g/dL  --   --  6 6   BILIRUBIN TOTAL mg/dL  --   --  0 80   ALK PHOS U/L  --   --  109   ALT U/L  --   --  19   AST U/L  --   --  11   GLUCOSE RANDOM mg/dL 216*  < > 126   < > = values in this interval not displayed  * I Have Reviewed All Lab Data Listed Above  * Additional Pertinent Lab Tests Reviewed: Mikhail 66 Admission Reviewed    Imaging:  Xr Chest 1 View Portable    Result Date: 11/12/2017  Narrative: CHEST INDICATION:  Shortness of breath COMPARISON:  None VIEWS:   AP frontal IMAGES:  1 FINDINGS:     Heart shadow appears unremarkable  Atherosclerotic vascular calcifications are noted  Lung volumes diminished  Elevation of left hemidiaphragm  Visualized osseous structures appear within normal limits for the patient's age  Impression: No active pulmonary disease on examination which is somewhat limited secondary to low lung volumes  Workstation performed: [id number]     Cta Ed Chest Pe Study    Result Date: 11/11/2017  Narrative: CTA - CHEST WITH IV CONTRAST - PULMONARY ANGIOGRAM INDICATION: Shortness of breath, hypoxia  COMPARISON: None  TECHNIQUE: CTA examination of the chest was performed using angiographic technique according to a protocol specifically tailored to evaluate for pulmonary embolism  Reformatted images were created in axial, sagittal, and coronal planes  In addition, coronal 3D MIP postprocessing was performed on the acquisition scanner  Radiation dose length product (DLP) for this visit:  573 84 mGy-cm     This examination, like all CT scans performed in the [facility], was performed utilizing techniques to minimize radiation dose exposure, including the use of iterative  reconstruction and automated exposure control  IV Contrast:  85 mL of iohexol (OMNIPAQUE)      FINDINGS: PULMONARY ARTERIAL TREE:  No pulmonary embolus is seen  Central prominence of the pulmonary arteries which may be related to pulmonary artery hypertension  LUNGS:  Mild patchy opacity in the right lower lobe and related to atelectasis or infiltrate  Bronchial wall thickening noted in the right lower lobe  Scattered linear atelectasis versus scarring in the right upper lobe  Mild dependent atelectatic changes  Mild interlobular septal thickening, mild CHF is not excluded  Volume loss on the right  PLEURA:  Unremarkable  HEART/AORTA:  Heart and mediastinum are shifted to the right  Thoracic aorta is tortuous with moderate wall calcification  Heart is mildly enlarged  Mitral valve calcification noted  MEDIASTINUM AND FRANCES:  Prominent subcarinal lymph node measures 2 7 x 1 8 cm  No significant hilar lymphadenopathy  CHEST WALL AND LOWER NECK:       Normal  VISUALIZED STRUCTURES IN THE UPPER ABDOMEN:  Infrarenal abdominal aortic aneurysm measures 3 1 x 2 8 cm in transaxial dimension  Small gallstones layering in the gallbladder  Right adrenal appears thickened  Left adrenal gland is unremarkable  OSSEOUS STRUCTURES:  No acute fracture or destructive osseous lesion  Multilevel degenerative changes in the spine  Curvature of the thoracic spine to the left  Impression: 1  No evidence of pulmonary embolism  2   Patchy opacity in the right lower lobe may be related to atelectasis or infiltrate  Bronchial thickening in the right lower lobe  Mild interlobular septal thickening, mild CHF is not excluded  3   Single prominent subcarinal lymph node, nonspecific finding  Consider continued follow-up  4   Mildly aneurysmal abdominal aorta  Gallstones   ##sigslh##sigslh    Workstation performed: [id number]     Imaging Reports Reviewed by myself    Cultures:   Blood Culture:   Lab Results   Component Value Date    BLOODCX No Growth at 24 hrs  11/11/2017    BLOODCX No Growth at 24 hrs  11/11/2017     Urine Culture: No results found for: URINECX  Sputum Culture: No components found for: SPUTUMCX  Wound Culture: No results found for: WOUNDCULT    Last 24 Hours Medication List:     aspirin 81 mg Oral Daily   atorvastatin 40 mg Oral Daily With Dinner   brimonidine-timolol 1 drop Both Eyes Q12H [address]   budesonide 0 5 mg Nebulization Daily   citalopram 10 mg Oral Daily   heparin (porcine) 5,000 Units Subcutaneous Q8H [address]   ipratropium-albuterol 3 mL Nebulization Q6H   methylPREDNISolone sodium succinate 20 mg Intravenous Q8H [address]   piperacillin-tazobactam (ZOSYN) 3 375 g in dextrose 5% 100 mL IVPB 3 375 g Intravenous Q6H        Today, Patient Was Seen By: Pat Wheat MD    ** Please Note: Dragon 360 Dictation voice to text software may have been used in the creation of this document   **

## 2017-11-14 NOTE — PROGRESS NOTES
[facility] Internal Medicine Progress Note  Patient: Jeff Kowalski 80 y o  female   MRN: [id number]  PCP: SHERYL Short  Unit/Bed#: [address] [place] [address] Encounter: [id number]  Date Of Visit: 11/14/17    Problem List:    Principal Problem:    Acute [facility] failure ([facility] )  Active Problems:    HCAP (healthcare-associated pneumonia)    COPD exacerbation ([facility] )    HLD (hyperlipidemia)    Hypokalemia      Assessment & Plan:    1  Acute hypoxic respiratory failure-likely secondary to combination of COPD and pneumonia  Continue oxygen supplementation and titrate of oxygen as tolerated  2  Sepsis secondary to right lower lobe pneumonia-patient has improved white count  Urine for Legionella pneumococcal antigens are negative on flu swab was negative  MRSA surveillance is negative  Continue IV Zosyn for now  3  Acute exacerbation of COPD-continue Solu-Medrol 20 milligram IV q 8 hours and bronchodilators  Continue pulmicort  4  Hyperlipidemia-continue Lipitor  5  History of aortic stenosis  6  Abnormal EKG with left bundle branch block-serial cardiac enzymes were negative  7  History of depression-on Celexa      VTE Pharmacologic Prophylaxis:   Pharmacologic: Heparin  Mechanical VTE Prophylaxis in Place: Yes    Patient Centered Rounds: I have performed bedside rounds with nursing staff today  Discussions with Specialists or Other Care Team Provider: No    Education and Discussions with Family / Patient:Yes    Time Spent for Care: 30 minutes  More than 50% of total time spent on counseling and coordination of care as described above  Current Length of Stay: 3 day(s)    Current Patient Status: Inpatient     Discharge Plan: [facility]    Code Status: Level 1 - Full Code      Subjective:   Patient denies any shortness of breath but complains of dry cough    Patient denies any chest pain or abdominal pain    Objective:         Negative for Chest Pain, Palpitations, Shortness of Breath, Abdominal Pain, Nausea, Vomiting, Constipation, Diarrhea, Dizziness  All other 10 review of systems negative and without drastic interval changes from yesterday  Vitals:   Temp (24hrs), Av 3 °F (36 8 °C), Min:98 1 °F (36 7 °C), Max:98 6 °F (37 °C)    HR:  [77-94] 82  Resp:  [18] 18  BP: (125-147)/(61-76) 147/67  SpO2:  [93 %-99 %] 99 %  Body mass index is 23 15 kg/m²  Input and Output Summary (last 24 hours): Intake/Output Summary (Last 24 hours) at 17 1747  Last data filed at 17 0901   Gross per 24 hour   Intake              250 ml   Output              420 ml   Net             -170 ml       Physical Exam:     Physical Exam   Constitutional: No distress  HENT:   Head: Normocephalic and atraumatic  Nose: Nose normal    Eyes: Conjunctivae and EOM are normal  Pupils are equal, round, and reactive to light  Neck: Normal range of motion  Neck supple  No JVD present  Cardiovascular: Normal rate and regular rhythm  Exam reveals no gallop and no friction rub  Murmur heard  Positive ejection systolic murmur   Pulmonary/Chest: Effort normal and breath sounds normal  No respiratory distress  She has no wheezes  She has no rales  She exhibits no tenderness  Abdominal: Soft  Bowel sounds are normal  She exhibits no distension  There is no tenderness  There is no rebound and no guarding  Musculoskeletal: She exhibits no edema  Neurological: She is alert  No cranial nerve deficit  Skin: Skin is warm and dry  No rash noted  Psychiatric: She has a normal mood and affect         Additional Data:     Labs:      Results from last 7 days  Lab Units 17  0542   WBC Thousand/uL 15 60*   HEMOGLOBIN g/dL 10 9*   HEMATOCRIT % 33 6*   PLATELETS Thousands/uL 193   NEUTROS PCT % 96*   LYMPHS PCT % 2*   MONOS PCT % 2*   EOS PCT % 0       Results from last 7 days  Lab Units 17  0542  17  1857   SODIUM mmol/L 142  < > 139   POTASSIUM mmol/L 4 4  < > 2 8*   CHLORIDE mmol/L 106  < > 99*   CO2 mmol/L 26  < > 31   BUN mg/dL 28*  < > 14   CREATININE mg/dL 0 65  < > 0 57*   CALCIUM mg/dL 8 0*  < > 8 4   TOTAL PROTEIN g/dL  --   --  6 6   BILIRUBIN TOTAL mg/dL  --   --  0 80   ALK PHOS U/L  --   --  109   ALT U/L  --   --  19   AST U/L  --   --  11   GLUCOSE RANDOM mg/dL 149*  < > 126   < > = values in this interval not displayed  * I Have Reviewed All Lab Data Listed Above  * Additional Pertinent Lab Tests Reviewed: Mikhail 66 Admission Reviewed    Imaging:  Xr Chest 1 View Portable    Result Date: 11/12/2017  Narrative: CHEST INDICATION:  Shortness of breath COMPARISON:  None VIEWS:   AP frontal IMAGES:  1 FINDINGS:     Heart shadow appears unremarkable  Atherosclerotic vascular calcifications are noted  Lung volumes diminished  Elevation of left hemidiaphragm  Visualized osseous structures appear within normal limits for the patient's age  Impression: No active pulmonary disease on examination which is somewhat limited secondary to low lung volumes  Workstation performed: [id number]     Cta Ed Chest Pe Study    Result Date: 11/11/2017  Narrative: CTA - CHEST WITH IV CONTRAST - PULMONARY ANGIOGRAM INDICATION: Shortness of breath, hypoxia  COMPARISON: None  TECHNIQUE: CTA examination of the chest was performed using angiographic technique according to a protocol specifically tailored to evaluate for pulmonary embolism  Reformatted images were created in axial, sagittal, and coronal planes  In addition, coronal 3D MIP postprocessing was performed on the acquisition scanner  Radiation dose length product (DLP) for this visit:  573 84 mGy-cm   This examination, like all CT scans performed in the [facility], was performed utilizing techniques to minimize radiation dose exposure, including the use of iterative  reconstruction and automated exposure control   IV Contrast:  85 mL of iohexol (OMNIPAQUE)      FINDINGS: PULMONARY ARTERIAL TREE:  No pulmonary embolus is seen    Central prominence of the pulmonary arteries which may be related to pulmonary artery hypertension  LUNGS:  Mild patchy opacity in the right lower lobe and related to atelectasis or infiltrate  Bronchial wall thickening noted in the right lower lobe  Scattered linear atelectasis versus scarring in the right upper lobe  Mild dependent atelectatic changes  Mild interlobular septal thickening, mild CHF is not excluded  Volume loss on the right  PLEURA:  Unremarkable  HEART/AORTA:  Heart and mediastinum are shifted to the right  Thoracic aorta is tortuous with moderate wall calcification  Heart is mildly enlarged  Mitral valve calcification noted  MEDIASTINUM AND FRANCES:  Prominent subcarinal lymph node measures 2 7 x 1 8 cm  No significant hilar lymphadenopathy  CHEST WALL AND LOWER NECK:       Normal  VISUALIZED STRUCTURES IN THE UPPER ABDOMEN:  Infrarenal abdominal aortic aneurysm measures 3 1 x 2 8 cm in transaxial dimension  Small gallstones layering in the gallbladder  Right adrenal appears thickened  Left adrenal gland is unremarkable  OSSEOUS STRUCTURES:  No acute fracture or destructive osseous lesion  Multilevel degenerative changes in the spine  Curvature of the thoracic spine to the left  Impression: 1  No evidence of pulmonary embolism  2   Patchy opacity in the right lower lobe may be related to atelectasis or infiltrate  Bronchial thickening in the right lower lobe  Mild interlobular septal thickening, mild CHF is not excluded  3   Single prominent subcarinal lymph node, nonspecific finding  Consider continued follow-up  4   Mildly aneurysmal abdominal aorta  Gallstones   ##[email or web address]##sigslh    Workstation performed: [id number]     Imaging Reports Reviewed by myself    Cultures:   Blood Culture:   Lab Results   Component Value Date    BLOODCX No Growth at 48 hrs  11/11/2017    BLOODCX No Growth at 48 hrs  11/11/2017     Urine Culture: No results found for: URINECX  Sputum Culture: No components found for: SPUTUMCX  Wound Culture: No results found for: WOUNDCULT    Last 24 Hours Medication List:     aspirin 81 mg Oral Daily   atorvastatin 40 mg Oral Daily With Dinner   brimonidine-timolol 1 drop Both Eyes Q12H [address]   budesonide 0 5 mg Nebulization Daily   citalopram 10 mg Oral Daily   heparin (porcine) 5,000 Units Subcutaneous Q8H [address]   ipratropium-albuterol 3 mL Nebulization Q6H   methylPREDNISolone sodium succinate 20 mg Intravenous Q8H [address]   piperacillin-tazobactam (ZOSYN) 3 375 g in dextrose 5% 100 mL IVPB 3 375 g Intravenous Q6H        Today, Patient Was Seen By: Aniyah Valencia MD    ** Please Note: Dragon 360 Dictation voice to text software may have been used in the creation of this document   **

## 2017-11-14 NOTE — PHYSICAL THERAPY NOTE
PT TREATMENT     11/14/17 1668   Pain Assessment   Pain Assessment No/denies pain   Cognition   Arousal/Participation (only spoke two times "no" and "ok")   Following Commands Follows all commands and directions without difficulty   Comments flat affect   Subjective   Subjective "no" "OK"     Exercises   Heelslides Supine;15 reps;Bilateral   Hip Abduction Supine;15 reps;Bilateral   Knee AROM Short Arc Quad Supine;20 reps;Bilateral   Ankle Pumps Supine;20 reps;Bilateral   Assessment   Prognosis Good   Problem List Decreased strength;Decreased endurance; Impaired balance;Decreased mobility   Assessment Per RN pt was just put back to bed  Pt  stated "no" to walking or getting OOB and "ok" to exercises in bed  Other than that, pt kept eyes open throughout exercises but did not verbalize or have expression  Bed alarm on  Cont  PT   Goals   Patient Goals none stated   Plan   Treatment/Interventions LE strengthening/ROM; Therapeutic exercise; Endurance training;Patient/family training;Equipment eval/education; Bed mobility;Gait training;Spoke to nursing   Progress Progressing toward goals   Recommendation   Recommendation (return to assisted living with PT/OT)

## 2017-11-14 NOTE — PLAN OF CARE
DISCHARGE PLANNING - CARE MANAGEMENT     Discharge to post-acute care or home with appropriate resources Progressing        Knowledge Deficit     Patient/family/caregiver demonstrates understanding of disease process, treatment plan, medications, and discharge instructions Progressing        MUSCULOSKELETAL - ADULT     Maintain or return mobility to safest level of function Progressing        Potential for Falls     Patient will remain free of falls Progressing        Prexisting or High Potential for Compromised Skin Integrity     Skin integrity is maintained or improved Progressing        RESPIRATORY - ADULT     Achieves optimal ventilation and oxygenation Progressing        SAFETY ADULT     Maintain or return to baseline ADL function Progressing     Maintain or return mobility status to optimal level Progressing     Patient will remain free of falls Progressing

## 2017-11-14 NOTE — PLAN OF CARE
Problem: PHYSICAL THERAPY ADULT  Goal: Performs mobility at highest level of function for planned discharge setting  See evaluation for individualized goals  Outcome: Progressing  Prognosis: Good  Problem List: Decreased strength, Decreased endurance, Impaired balance, Decreased mobility  Assessment: Per RN pt was just put back to bed  Pt  stated "no" to walking or getting OOB and "ok" to exercises in bed  Other than that, pt kept eyes open throughout exercises but did not verbalize or have expression  Bed alarm on  Cont  PT        Recommendation:  (return to assisted living with PT/OT)          See flowsheet documentation for full assessment

## 2017-11-15 LAB
ANION GAP SERPL CALCULATED.3IONS-SCNC: 9 MMOL/L (ref 4–13)
BASOPHILS # BLD AUTO: 0 THOUSANDS/ΜL (ref 0–0.1)
BASOPHILS NFR BLD AUTO: 0 % (ref 0–1)
BUN SERPL-MCNC: 22 MG/DL (ref 5–25)
CALCIUM SERPL-MCNC: 8.1 MG/DL (ref 8.3–10.1)
CHLORIDE SERPL-SCNC: 104 MMOL/L (ref 100–108)
CO2 SERPL-SCNC: 26 MMOL/L (ref 21–32)
CREAT SERPL-MCNC: 0.66 MG/DL (ref 0.6–1.3)
EOSINOPHIL # BLD AUTO: 0 THOUSAND/ΜL (ref 0–0.61)
EOSINOPHIL NFR BLD AUTO: 0 % (ref 0–6)
ERYTHROCYTE [DISTWIDTH] IN BLOOD BY AUTOMATED COUNT: 14.9 % (ref 11.6–15.1)
GFR SERPL CREATININE-BSD FRML MDRD: 83 ML/MIN/1.73SQ M
GLUCOSE SERPL-MCNC: 148 MG/DL (ref 65–140)
HCT VFR BLD AUTO: 34.5 % (ref 37–47)
HGB BLD-MCNC: 11.2 G/DL (ref 12–16)
LYMPHOCYTES # BLD AUTO: 0.3 THOUSANDS/ΜL (ref 0.6–4.47)
LYMPHOCYTES NFR BLD AUTO: 3 % (ref 14–44)
MAGNESIUM SERPL-MCNC: 2 MG/DL (ref 1.6–2.6)
MCH RBC QN AUTO: 31.6 PG (ref 27–31)
MCHC RBC AUTO-ENTMCNC: 32.4 G/DL (ref 31.4–37.4)
MCV RBC AUTO: 98 FL (ref 82–98)
MONOCYTES # BLD AUTO: 0.3 THOUSAND/ΜL (ref 0.17–1.22)
MONOCYTES NFR BLD AUTO: 3 % (ref 4–12)
NEUTROPHILS # BLD AUTO: 9.9 THOUSANDS/ΜL (ref 1.85–7.62)
NEUTS SEG NFR BLD AUTO: 94 % (ref 43–75)
NRBC BLD AUTO-RTO: 0 /100 WBCS
PLATELET # BLD AUTO: 202 THOUSANDS/UL (ref 130–400)
PLATELET BLD QL SMEAR: ADEQUATE
PMV BLD AUTO: 8.8 FL (ref 8.9–12.7)
POTASSIUM SERPL-SCNC: 4.2 MMOL/L (ref 3.5–5.3)
RBC # BLD AUTO: 3.53 MILLION/UL (ref 4.2–5.4)
SODIUM SERPL-SCNC: 139 MMOL/L (ref 136–145)
WBC # BLD AUTO: 10.5 THOUSAND/UL (ref 4.8–10.8)

## 2017-11-15 PROCEDURE — 97535 SELF CARE MNGMENT TRAINING: CPT

## 2017-11-15 PROCEDURE — 94760 N-INVAS EAR/PLS OXIMETRY 1: CPT

## 2017-11-15 PROCEDURE — 80048 BASIC METABOLIC PNL TOTAL CA: CPT | Performed by: INTERNAL MEDICINE

## 2017-11-15 PROCEDURE — 94620 HB PULMONARY STRESS TEST/SIMPLE: CPT

## 2017-11-15 PROCEDURE — 85025 COMPLETE CBC W/AUTO DIFF WBC: CPT | Performed by: INTERNAL MEDICINE

## 2017-11-15 PROCEDURE — 94640 AIRWAY INHALATION TREATMENT: CPT

## 2017-11-15 PROCEDURE — 97110 THERAPEUTIC EXERCISES: CPT

## 2017-11-15 PROCEDURE — 83735 ASSAY OF MAGNESIUM: CPT | Performed by: INTERNAL MEDICINE

## 2017-11-15 RX ORDER — FUROSEMIDE 10 MG/ML
40 INJECTION INTRAMUSCULAR; INTRAVENOUS ONCE
Status: COMPLETED | OUTPATIENT
Start: 2017-11-15 | End: 2017-11-15

## 2017-11-15 RX ADMIN — IPRATROPIUM BROMIDE AND ALBUTEROL SULFATE 3 ML: .5; 3 SOLUTION RESPIRATORY (INHALATION) at 01:16

## 2017-11-15 RX ADMIN — HEPARIN SODIUM 5000 UNITS: 5000 INJECTION, SOLUTION INTRAVENOUS; SUBCUTANEOUS at 21:30

## 2017-11-15 RX ADMIN — METHYLPREDNISOLONE SODIUM SUCCINATE 20 MG: 40 INJECTION, POWDER, FOR SOLUTION INTRAMUSCULAR; INTRAVENOUS at 13:27

## 2017-11-15 RX ADMIN — ATORVASTATIN CALCIUM 40 MG: 40 TABLET, FILM COATED ORAL at 18:57

## 2017-11-15 RX ADMIN — PIPERACILLIN SODIUM,TAZOBACTAM SODIUM 3.38 G: 3; .375 INJECTION, POWDER, FOR SOLUTION INTRAVENOUS at 18:57

## 2017-11-15 RX ADMIN — HEPARIN SODIUM 5000 UNITS: 5000 INJECTION, SOLUTION INTRAVENOUS; SUBCUTANEOUS at 05:44

## 2017-11-15 RX ADMIN — METHYLPREDNISOLONE SODIUM SUCCINATE 20 MG: 40 INJECTION, POWDER, FOR SOLUTION INTRAMUSCULAR; INTRAVENOUS at 05:44

## 2017-11-15 RX ADMIN — PIPERACILLIN SODIUM,TAZOBACTAM SODIUM 3.38 G: 3; .375 INJECTION, POWDER, FOR SOLUTION INTRAVENOUS at 13:27

## 2017-11-15 RX ADMIN — ASPIRIN 81 MG 81 MG: 81 TABLET ORAL at 08:54

## 2017-11-15 RX ADMIN — FUROSEMIDE 40 MG: 10 INJECTION, SOLUTION INTRAMUSCULAR; INTRAVENOUS at 18:57

## 2017-11-15 RX ADMIN — CITALOPRAM HYDROBROMIDE 10 MG: 20 TABLET ORAL at 08:54

## 2017-11-15 RX ADMIN — PIPERACILLIN SODIUM,TAZOBACTAM SODIUM 3.38 G: 3; .375 INJECTION, POWDER, FOR SOLUTION INTRAVENOUS at 06:07

## 2017-11-15 RX ADMIN — PIPERACILLIN SODIUM,TAZOBACTAM SODIUM 3.38 G: 3; .375 INJECTION, POWDER, FOR SOLUTION INTRAVENOUS at 00:43

## 2017-11-15 RX ADMIN — IPRATROPIUM BROMIDE AND ALBUTEROL SULFATE 3 ML: .5; 3 SOLUTION RESPIRATORY (INHALATION) at 07:17

## 2017-11-15 RX ADMIN — IPRATROPIUM BROMIDE AND ALBUTEROL SULFATE 3 ML: .5; 3 SOLUTION RESPIRATORY (INHALATION) at 14:56

## 2017-11-15 RX ADMIN — METHYLPREDNISOLONE SODIUM SUCCINATE 20 MG: 40 INJECTION, POWDER, FOR SOLUTION INTRAMUSCULAR; INTRAVENOUS at 21:29

## 2017-11-15 RX ADMIN — IPRATROPIUM BROMIDE AND ALBUTEROL SULFATE 3 ML: .5; 3 SOLUTION RESPIRATORY (INHALATION) at 20:56

## 2017-11-15 RX ADMIN — HEPARIN SODIUM 5000 UNITS: 5000 INJECTION, SOLUTION INTRAVENOUS; SUBCUTANEOUS at 14:57

## 2017-11-15 NOTE — SOCIAL WORK
SW contacted [facility] to discuss pt's baseline  I spoke with Wendy Iyer and was informed that pt ambulates with a walker  She requires max assist with completing ADL's  Pt also needs to be told to relieve herself  SW will continue following to assist with plans and discharge needs

## 2017-11-15 NOTE — CASE MANAGEMENT
Continued Stay Review    Date: 11/15/17    Vital Signs: BP (!) 174/79   Pulse 67   Temp 98 2 °F (36 8 °C) (Tympanic)   Resp 16   Ht 5' 2" (1 575 m)   Wt 56 4 kg (124 lb 5 4 oz)   SpO2 93%   BMI 22 74 kg/m²     Medications:   Scheduled Meds:   aspirin 81 mg Oral Daily   atorvastatin 40 mg Oral Daily With Dinner   brimonidine-timolol 1 drop Both Eyes Q12H [address]   budesonide 0 5 mg Nebulization Daily   citalopram 10 mg Oral Daily   heparin (porcine) 5,000 Units Subcutaneous Q8H [address]   ipratropium-albuterol 3 mL Nebulization Q6H   methylPREDNISolone sodium succinate 20 mg Intravenous Q8H [address]   piperacillin-tazobactam (ZOSYN) 3 375 g in dextrose 5% 100 mL IVPB 3 375 g Intravenous Q6H     Continuous Infusions:    PRN Meds:   acetaminophen    albuterol    Abnormal Labs/Diagnostic Results: GLUCOSE 148     Age/Sex: 80 y o  female     PER MD  Assessment & Plan:     1  Acute hypoxic respiratory failure-likely secondary to combination of COPD and pneumonia  Continue oxygen supplementation and titrate of oxygen as tolerated  2  Sepsis secondary to right lower lobe pneumonia-patient has improved white count  Urine for Legionella pneumococcal antigens are negative on flu swab was negative  MRSA surveillance is negative  Continue IV Zosyn for now  3  Acute exacerbation of COPD-continue Solu-Medrol 20 milligram IV q 8 hours and bronchodilators  Continue pulmicort  4  Hyperlipidemia-continue Lipitor  5  History of aortic stenosis  6  Abnormal EKG with left bundle branch block-serial cardiac enzymes were negative    7  History of depression-on Celexa

## 2017-11-15 NOTE — OCCUPATIONAL THERAPY NOTE
OT TREATMENT     11/15/17 4889   Restrictions/Precautions   Other Precautions Fall Risk;O2;Cognitive; Chair Alarm; Bed Alarm   Pain Assessment   Pain Assessment No/denies pain   Pain Score No Pain   ADL   Where Assessed Edge of bed   LB Dressing Assistance 4  Minimal Assistance   Toileting Comments pt incontinent of urine in bed   Bed Mobility   Rolling L 4  Minimal assistance   Supine to Sit 3  Moderate assistance   Transfers   Sit to Stand 4  Minimal assistance   Stand to Sit 4  Minimal assistance   Stand pivot 4  Minimal assistance   Cognition   Attention Attends with cues to redirect   Orientation Level Oriented to person;Oriented to place   Following Commands Follows one step commands without difficulty   Activity Tolerance   Activity Tolerance Patient limited by fatigue   Assessment   Assessment Pt tolerated treatment well, requires cues and assist to complete ADLS, transfers and bed mobility  pt incontinent of urine in bed  pt SPO2% at rest 92, with activity 87  pt cued for controlled breathing throughout  pt will continue to benefit from skilled tx will follow  Plan   Treatment Interventions ADL retraining;Functional transfer training;UE strengthening/ROM; Endurance training;Patient/family training;Equipment evaluation/education; Activityengagement; Energy conservation   OT Frequency 3-5x/wk   Recommendation   OT Discharge Recommendation (return to [facility] with PT/OT)

## 2017-11-15 NOTE — RESPIRATORY THERAPY NOTE
Home Oxygen Qualifying Test       Patient name: Jacque Guzman        : 1934   Date of Test:  November 15, 2017  Diagnosis:      Home Oxygen Test:    **Medicare Guidelines require item(s) 1-5 on all ambulatory patients or 1 and 2 on on-ambulatory patients  1   Baseline SPO2 on Room Air at rest 90 %  If = or < 88% on room air add O2 via NC and titrate patient  Patient must be ambulated with O2 and titrated to > 88% with exertion  2   SPO2 on Oxygen at rest 96 %  3   SPO2 during exercise on Room Air 86 %  4   SPO2 during exercise on Oxygen  93% at a liter flow of 2 lpm     5   Exercise performed:    [x] Walking     [] Stairs     [x] Duration 4 (min )     [] Distance (ft )       [x]  Supplemental Home Oxygen is indicated  []  Client does not qualify for home oxygen        [place], [place]

## 2017-11-15 NOTE — PROGRESS NOTES
[facility] Internal Medicine Progress Note  Patient: Lorri Guido 80 y o  female   MRN: [id number]  PCP: SHERYL Valadez  Unit/Bed#: [address] [place] [address] Encounter: [id number]  Date Of Visit: 11/15/17    Problem List:    Principal Problem:    Acute respiratory failure ([facility] )  Active Problems:    HCAP (healthcare-associated pneumonia)    COPD exacerbation ([facility] )    HLD (hyperlipidemia)    Hypokalemia      Assessment & Plan:    1  Acute hypoxic respiratory failure-likely secondary to combination of COPD and pneumonia  Patient continues to be hypoxic and patient is ambulatory oxygen saturation dropped to 87%  Got a home O2 qualifying test and patient is needing 2 liters oxygen on exertion  Will arrange for oxygen and will discharge in a m     Will also give a dose of Lasix 40 milligrams IV as initial CT scan in the ED showed a component of CHF     2  Sepsis secondary to right lower lobe pneumonia-signs of sepsis have resolved  Patient's white count is normal today     Urine for Legionella pneumococcal antigens are negative on flu swab was negative  Continue empiric Zosyn  3  Acute exacerbation of COPD-continue Solu-Medrol 20 milligram IV q 8 hours, pulmicort and bronchodilators  4  Hyperlipidemia-continue Lipitor  5  History of aortic stenosis  6  Abnormal EKG with left bundle branch block-serial cardiac enzymes were negative  7  History of depression-on Celexa      VTE Pharmacologic Prophylaxis:   Pharmacologic: Heparin  Mechanical VTE Prophylaxis in Place: Yes    Patient Centered Rounds: I have performed bedside rounds with nursing staff today  Discussions with Specialists or Other Care Team Provider: No    Education and Discussions with Family / Patient:Yes    Time Spent for Care: 30 minutes  More than 50% of total time spent on counseling and coordination of care as described above      Current Length of Stay: 4 day(s)    Current Patient Status: Inpatient     Discharge Plan: [facility]    Code Status: Level 1 - Full Code      Subjective:   Patient still having some dry cough  Denies any shortness of breath, chest pain    Objective:         Negative for Chest Pain, Palpitations, Shortness of Breath, Abdominal Pain, Nausea, Vomiting, Constipation, Diarrhea, Dizziness  All other 10 review of systems negative and without drastic interval changes from yesterday     Vitals:   Temp (24hrs), Av 4 °F (36 9 °C), Min:98 2 °F (36 8 °C), Max:98 5 °F (36 9 °C)    HR:  [67-89] 89  Resp:  [16-18] 18  BP: (142-177)/(69-79) 177/75  SpO2:  [90 %-98 %] 90 %  Body mass index is 22 74 kg/m²  Input and Output Summary (last 24 hours): Intake/Output Summary (Last 24 hours) at 11/15/17 1810  Last data filed at 11/15/17 0300   Gross per 24 hour   Intake                0 ml   Output              120 ml   Net             -120 ml       Physical Exam:     Physical Exam   Constitutional: No distress  HENT:   Head: Normocephalic and atraumatic  Nose: Nose normal    Eyes: Conjunctivae and EOM are normal  Pupils are equal, round, and reactive to light  Neck: Normal range of motion  Neck supple  No JVD present  Cardiovascular: Normal rate, regular rhythm and normal heart sounds  Exam reveals no gallop and no friction rub  No murmur heard  Pulmonary/Chest: Effort normal and breath sounds normal  No respiratory distress  She has no wheezes  She has no rales  She exhibits no tenderness  Abdominal: Soft  Bowel sounds are normal  She exhibits no distension  There is no tenderness  There is no rebound and no guarding  Musculoskeletal: She exhibits no edema  Neurological: She is alert  No cranial nerve deficit  Skin: Skin is warm and dry  No rash noted  Psychiatric: She has a normal mood and affect         Additional Data:     Labs:      Results from last 7 days  Lab Units 11/15/17  0458   WBC Thousand/uL 10 50   HEMOGLOBIN g/dL 11 2*   HEMATOCRIT % 34 5*   PLATELETS Thousands/uL 202   NEUTROS PCT % 94*   LYMPHS PCT % 3*   MONOS PCT % 3*   EOS PCT % 0       Results from last 7 days  Lab Units 11/15/17  0458  11/11/17  1857   SODIUM mmol/L 139  < > 139   POTASSIUM mmol/L 4 2  < > 2 8*   CHLORIDE mmol/L 104  < > 99*   CO2 mmol/L 26  < > 31   BUN mg/dL 22  < > 14   CREATININE mg/dL 0 66  < > 0 57*   CALCIUM mg/dL 8 1*  < > 8 4   TOTAL PROTEIN g/dL  --   --  6 6   BILIRUBIN TOTAL mg/dL  --   --  0 80   ALK PHOS U/L  --   --  109   ALT U/L  --   --  19   AST U/L  --   --  11   GLUCOSE RANDOM mg/dL 148*  < > 126   < > = values in this interval not displayed  * I Have Reviewed All Lab Data Listed Above  * Additional Pertinent Lab Tests Reviewed: [facility] Admission Reviewed    Imaging:  Xr Chest 1 View Portable    Result Date: 11/12/2017  Narrative: CHEST INDICATION:  Shortness of breath COMPARISON:  None VIEWS:   AP frontal IMAGES:  1 FINDINGS:     Heart shadow appears unremarkable  Atherosclerotic vascular calcifications are noted  Lung volumes diminished  Elevation of left hemidiaphragm  Visualized osseous structures appear within normal limits for the patient's age  Impression: No active pulmonary disease on examination which is somewhat limited secondary to low lung volumes  Workstation performed: [id number]     Cta Ed Chest Pe Study    Result Date: 11/11/2017  Narrative: CTA - CHEST WITH IV CONTRAST - PULMONARY ANGIOGRAM INDICATION: Shortness of breath, hypoxia  COMPARISON: None  TECHNIQUE: CTA examination of the chest was performed using angiographic technique according to a protocol specifically tailored to evaluate for pulmonary embolism  Reformatted images were created in axial, sagittal, and coronal planes  In addition, coronal 3D MIP postprocessing was performed on the acquisition scanner  Radiation dose length product (DLP) for this visit:  573 84 mGy-cm     This examination, like all CT scans performed in the [facility], was performed utilizing techniques to minimize radiation dose exposure, including the use of iterative  reconstruction and automated exposure control  IV Contrast:  85 mL of iohexol (OMNIPAQUE)      FINDINGS: PULMONARY ARTERIAL TREE:  No pulmonary embolus is seen  Central prominence of the pulmonary arteries which may be related to pulmonary artery hypertension  LUNGS:  Mild patchy opacity in the right lower lobe and related to atelectasis or infiltrate  Bronchial wall thickening noted in the right lower lobe  Scattered linear atelectasis versus scarring in the right upper lobe  Mild dependent atelectatic changes  Mild interlobular septal thickening, mild CHF is not excluded  Volume loss on the right  PLEURA:  Unremarkable  HEART/AORTA:  Heart and mediastinum are shifted to the right  Thoracic aorta is tortuous with moderate wall calcification  Heart is mildly enlarged  Mitral valve calcification noted  MEDIASTINUM AND FRANCES:  Prominent subcarinal lymph node measures 2 7 x 1 8 cm  No significant hilar lymphadenopathy  CHEST WALL AND LOWER NECK:       Normal  VISUALIZED STRUCTURES IN THE UPPER ABDOMEN:  Infrarenal abdominal aortic aneurysm measures 3 1 x 2 8 cm in transaxial dimension  Small gallstones layering in the gallbladder  Right adrenal appears thickened  Left adrenal gland is unremarkable  OSSEOUS STRUCTURES:  No acute fracture or destructive osseous lesion  Multilevel degenerative changes in the spine  Curvature of the thoracic spine to the left  Impression: 1  No evidence of pulmonary embolism  2   Patchy opacity in the right lower lobe may be related to atelectasis or infiltrate  Bronchial thickening in the right lower lobe  Mild interlobular septal thickening, mild CHF is not excluded  3   Single prominent subcarinal lymph node, nonspecific finding  Consider continued follow-up  4   Mildly aneurysmal abdominal aorta  Gallstones   ##sigslh##sigslh    Workstation performed: [id number]     Imaging Reports Reviewed by myself    Cultures:   Blood Culture:   Lab Results   Component Value Date    BLOODCX No Growth at 72 hrs  11/11/2017    BLOODCX No Growth at 72 hrs  11/11/2017     Urine Culture: No results found for: URINECX  Sputum Culture: No components found for: SPUTUMCX  Wound Culture: No results found for: WOUNDCULT    Last 24 Hours Medication List:     aspirin 81 mg Oral Daily   atorvastatin 40 mg Oral Daily With Dinner   brimonidine-timolol 1 drop Both Eyes Q12H [address]   budesonide 0 5 mg Nebulization Daily   citalopram 10 mg Oral Daily   furosemide 40 mg Intravenous Once   heparin (porcine) 5,000 Units Subcutaneous Q8H [address]   ipratropium-albuterol 3 mL Nebulization Q6H   methylPREDNISolone sodium succinate 20 mg Intravenous Q8H [address]   piperacillin-tazobactam (ZOSYN) 3 375 g in dextrose 5% 100 mL IVPB 3 375 g Intravenous Q6H        Today, Patient Was Seen By: Jesse Suarez MD    ** Please Note: Dragon 360 Dictation voice to text software may have been used in the creation of this document   **

## 2017-11-15 NOTE — PHYSICAL THERAPY NOTE
PT TREATMENT     11/15/17 6966   Pain Assessment   Pain Assessment No/denies pain   Restrictions/Precautions   Other Precautions Cognitive; Fall Risk;O2;Bed Alarm; Chair Alarm   General   Chart Reviewed Yes   Cognition   Comments (flat affect)   Transfers   Sit to Stand 4  Minimal assistance   Stand to Sit 4  Minimal assistance   Stand pivot 4  Minimal assistance   Ambulation/Elevation   Gait pattern Short stride; Forward Flexion   Gait Assistance 4  Minimal assist   Assistive Device Rolling walker   Distance 50 feet   Additional items (Sp02 87-90% during ambulation on RA)   Balance   Static Sitting Fair   Dynamic Sitting Fair   Static Standing Fair   Dynamic Standing Poor   Activity Tolerance   Activity Tolerance Patient limited by fatigue   Exercises   Hip Flexion 10 reps   Knee AROM Long Arc Quad 10 reps   Ankle Pumps 10 reps   Assessment   Prognosis Good   Problem List Decreased strength;Decreased endurance;Decreased mobility; Impaired balance   Assessment Pt continues to require min assist for ambulation with walker  RN notified that Sp02 dropped to 87% when walking, recommend ambulatory pulse oximetry  Plan   Treatment/Interventions ADL retraining;Functional transfer training;LE strengthening/ROM; Therapeutic exercise; Endurance training;Bed mobility;Gait training;Equipment eval/education;Patient/family training   Progress Progressing toward goals   Recommendation   Recommendation (return to [facility] with PT/OT)

## 2017-11-16 VITALS
TEMPERATURE: 99.5 F | HEART RATE: 87 BPM | BODY MASS INDEX: 23.04 KG/M2 | DIASTOLIC BLOOD PRESSURE: 72 MMHG | HEIGHT: 62 IN | SYSTOLIC BLOOD PRESSURE: 125 MMHG | OXYGEN SATURATION: 92 % | RESPIRATION RATE: 16 BRPM | WEIGHT: 125.22 LBS

## 2017-11-16 LAB
BACTERIA BLD CULT: NORMAL
BACTERIA BLD CULT: NORMAL

## 2017-11-16 PROCEDURE — 94640 AIRWAY INHALATION TREATMENT: CPT

## 2017-11-16 PROCEDURE — 94760 N-INVAS EAR/PLS OXIMETRY 1: CPT

## 2017-11-16 RX ORDER — PREDNISONE 10 MG/1
TABLET ORAL
Qty: 18 TABLET | Refills: 0 | Status: SHIPPED | OUTPATIENT
Start: 2017-11-16

## 2017-11-16 RX ORDER — AMOXICILLIN AND CLAVULANATE POTASSIUM 875; 125 MG/1; MG/1
1 TABLET, FILM COATED ORAL 2 TIMES DAILY
Qty: 14 TABLET | Refills: 0 | Status: SHIPPED | OUTPATIENT
Start: 2017-11-16 | End: 2017-11-17

## 2017-11-16 RX ORDER — IPRATROPIUM BROMIDE AND ALBUTEROL SULFATE 2.5; .5 MG/3ML; MG/3ML
3 SOLUTION RESPIRATORY (INHALATION)
Qty: 3 ML | Refills: 0 | Status: SHIPPED | OUTPATIENT
Start: 2017-11-16 | End: 2017-11-17

## 2017-11-16 RX ORDER — POLYETHYLENE GLYCOL 3350 17 G/17G
17 POWDER, FOR SOLUTION ORAL DAILY
Qty: 14 EACH | Refills: 0 | Status: SHIPPED | OUTPATIENT
Start: 2017-11-17

## 2017-11-16 RX ORDER — POLYETHYLENE GLYCOL 3350 17 G/17G
17 POWDER, FOR SOLUTION ORAL DAILY
Status: DISCONTINUED | OUTPATIENT
Start: 2017-11-16 | End: 2017-11-16 | Stop reason: HOSPADM

## 2017-11-16 RX ADMIN — ASPIRIN 81 MG 81 MG: 81 TABLET ORAL at 09:04

## 2017-11-16 RX ADMIN — HEPARIN SODIUM 5000 UNITS: 5000 INJECTION, SOLUTION INTRAVENOUS; SUBCUTANEOUS at 05:48

## 2017-11-16 RX ADMIN — PIPERACILLIN SODIUM,TAZOBACTAM SODIUM 3.38 G: 3; .375 INJECTION, POWDER, FOR SOLUTION INTRAVENOUS at 06:11

## 2017-11-16 RX ADMIN — IPRATROPIUM BROMIDE AND ALBUTEROL SULFATE 3 ML: .5; 3 SOLUTION RESPIRATORY (INHALATION) at 14:35

## 2017-11-16 RX ADMIN — PIPERACILLIN SODIUM,TAZOBACTAM SODIUM 3.38 G: 3; .375 INJECTION, POWDER, FOR SOLUTION INTRAVENOUS at 12:15

## 2017-11-16 RX ADMIN — PIPERACILLIN SODIUM,TAZOBACTAM SODIUM 3.38 G: 3; .375 INJECTION, POWDER, FOR SOLUTION INTRAVENOUS at 01:04

## 2017-11-16 RX ADMIN — METHYLPREDNISOLONE SODIUM SUCCINATE 20 MG: 40 INJECTION, POWDER, FOR SOLUTION INTRAMUSCULAR; INTRAVENOUS at 05:48

## 2017-11-16 RX ADMIN — METOPROLOL TARTRATE 25 MG: 25 TABLET ORAL at 10:36

## 2017-11-16 RX ADMIN — IPRATROPIUM BROMIDE AND ALBUTEROL SULFATE 3 ML: .5; 3 SOLUTION RESPIRATORY (INHALATION) at 02:50

## 2017-11-16 RX ADMIN — BUDESONIDE 0.5 MG: 0.5 INHALANT RESPIRATORY (INHALATION) at 08:24

## 2017-11-16 RX ADMIN — IPRATROPIUM BROMIDE AND ALBUTEROL SULFATE 3 ML: .5; 3 SOLUTION RESPIRATORY (INHALATION) at 08:24

## 2017-11-16 RX ADMIN — CITALOPRAM HYDROBROMIDE 10 MG: 20 TABLET ORAL at 09:04

## 2017-11-16 NOTE — DISCHARGE SUMMARY
Discharge Summary - [facility]  Internal Medicine    Patient Information: Isis Pak 80 y o  female MRN: [id number]  Unit/Bed#: [facility]01 Encounter: [id number]    Discharging Physician / Practitioner: Sissy Skelton MD  PCP: SHERYL Cruz  Admission Date: 11/11/2017  Discharge Date: 11/16/17    Reason for Admission: Shortness of Breath (pt sent in from [facility] for sob  pt was foudn with an 02 of 84% RA via EMS  Pt presents to the ed, with decreased breath sounds  Pt is alert x3, unaware of why she is at the hospital  )      Discharge Diagnoses:     Principal Problem:    Acute respiratory failure ([facility] )  Active Problems:    HCAP (healthcare-associated pneumonia)    COPD exacerbation ([facility] )    HLD (hyperlipidemia)    Hypokalemia  Resolved Problems:    * No resolved hospital problems  *  1  Acute hypoxemic respiratory failure  2  COPD requiring oxygen on ambulation  3  Sepsis secondary to Right lower lobe pneumonia  4  Hyperlipidemia  5  Aortic stenosis  6  Left bundle branch block  7  Hypertension  8  Depression    Consultations During Hospital Stay:  None    Imaging while in hospital:    Xr Chest 1 View Portable    Result Date: 11/12/2017  Impression: No active pulmonary disease on examination which is somewhat limited secondary to low lung volumes  Workstation performed: [id number]     Cta Ed Chest Pe Study  Result Date: 11/11/2017    Impression: 1  No evidence of pulmonary embolism  2   Patchy opacity in the right lower lobe may be related to atelectasis or infiltrate  Bronchial thickening in the right lower lobe  Mild interlobular septal thickening, mild CHF is not excluded  3   Single prominent subcarinal lymph node, nonspecific finding  Consider continued follow-up  4   Mildly aneurysmal abdominal aorta  Gallstones   ##sigslh##sigslh    Workstation performed: [id number]       Test Results Pending at Discharge (will require follow up):   · As per After Visit Summary    Complications: None    Hospital Course:     Jennifer Thomas is a 80 y o  female patient with past medical history of hyperlipidemia, depression who originally presented to the hospital on 11/11/2017 due to fever and shortness of breath  Patient was found to be hypoxic in the emergency room  Patient had CT scan in the emergency room which was concerning for pneumonia  Patient was continued on Solu-Medrol and was given vancomycin and Zosyn  Patient continued to improve and remained in a stable condition  Patient also noted to have left bundle-branch block on EKG and serial cardiac enzymes remained negative  Patient noted to have slightly elevated blood pressure in the 150 to 170 range and patient was started on low-dose metoprolol as patient also had some tachycardia  Patient's Solu-Medrol was tapered and later vancomycin was stopped  Patient had home oxygen assessment test before discharge and patient was needing 2 liters oxygen on exertion  Patient remained afebrile with normal white count and patient will be discharged back to [facility] with 2 liters oxygen on exertion, antibiotics and prednisone taper  Condition at Discharge: stable     Discharge Day Visit / Exam:     Subjective:  Patient does have some dry cough  Patient denies any shortness of breath, chest pain or palpitations    Vitals: Blood Pressure: 114/59 (11/16/17 1053)  Pulse: 100 (11/16/17 1053)  Temperature: 99 3 °F (37 4 °C) (11/16/17 1053)  Temp Source: Tympanic (11/16/17 1053)  Respirations: 16 (11/16/17 1053)  Height: 5' 2" (157 5 cm) (11/11/17 2300)  Weight - Scale: 56 8 kg (125 lb 3 5 oz) (11/16/17 0600)  SpO2: 95 % (11/16/17 1053)  Exam:   Physical Exam   Constitutional: No distress  HENT:   Head: Normocephalic and atraumatic  Nose: Nose normal    Eyes: Conjunctivae and EOM are normal  Pupils are equal, round, and reactive to light  Neck: Normal range of motion  Neck supple  No JVD present     Cardiovascular: Normal rate, regular rhythm and normal heart sounds  Exam reveals no gallop and no friction rub  No murmur heard  Pulmonary/Chest: Effort normal and breath sounds normal  No respiratory distress  She has no wheezes  She has no rales  She exhibits no tenderness  Abdominal: Soft  Bowel sounds are normal  She exhibits no distension  There is no tenderness  There is no rebound and no guarding  Musculoskeletal: She exhibits no edema  Neurological: She is alert  No cranial nerve deficit  Skin: Skin is warm and dry  No rash noted  Psychiatric: She has a normal mood and affect  Discharge instructions/Information to patient and family:(Discharge Medications and Follow up):   See after visit summary for information provided to patient and family  Provisions for Follow-Up Care:  See after visit summary for information related to follow-up care and any pertinent home health orders  Disposition: Home    Planned Readmission:  No     Discharge Statement:  I spent 35 minutes discharging the patient  This time was spent on the day of discharge  I had direct contact with the patient on the day of discharge  Greater than 50% of the total time was spent examining patient, answering all patient questions, arranging and discussing plan of care with patient as well as directly providing post-discharge instructions  Additional time then spent on discharge activities  Discharge Medications:  See after visit summary for reconciled discharge medications provided to patient and family  ** Please Note: Dragon 360 Dictation voice to text software may have been used in the creation of this document   **

## 2017-11-16 NOTE — SOCIAL WORK
SW called pt's daughter Kolby Martinez) to discuss transportation options  Fernanda reported that she was at work and would not be able to drive her mother back to [facility] today  She was agreeable to having transportation arranged by [facility]  I arranged for [address] to transport pt via wheelchair van at 1600  Pt, daughter, Seth Barcenas RN and [facility] aware of plan

## 2017-11-16 NOTE — SOCIAL WORK
CM ON PHONE WITH [facility], ISSUES WITH RE: TO REIMBURSEMENT  PER [facility], PT WITH PNEUMONIA EXAC A CHRONIC DISEASE IS NOT COVERABLE FOR OXYGEN USE AT HOME  PER [facility], WILL HAVE TO CALL [facility] FOR 30 DAY OF PAYMENT AND DISCUSS OPTIONS FOR NEB MACHINE  PER [facility], PT [facility] WILL COVER THE FIRST 30DAYS OR THE O2 AND NOTED THAT THE PATIENT ALREADY HAS A NEB MACHINE AT Replaced by [facility], NO NEED FOR A NEW ONE  WILL HAVE TO BRING TANK TO THE ROOM PRIOR TO DC AND [facility] WILL NEED TO BE NOTIFIED WHEN  IS SCHEDULED SO CONCENTRATOR CAN BE DELIVERED TO THE FACILITY UPON PT ARRIVAL  SW AWARE OF NEEDS

## 2017-11-16 NOTE — [place] UNIVERSAL TRANSFER FORM
[place] UNIV[place] TRANSFER FORM  (ALL ITEMS MUST BE COMPLETED)    1  TRANSFER FROM: [address] [place] [address] Street: [facility]    2  DATE OF TRANSFER: 11/16/2017                        TIME OF TRANSFER: 1530    3  PATIENT NAME: Margaret Samuels,        YOB: 1934                             GENDER: female    4  LANGUAGE:   English    5  PHYSICIAN NAME:  Roseann Blank MD                   PHONE: [phone]    6  CODE STATUS: Level 1 - Full Code        Out of Hospital DNR Attached: No    7  :                                      :  Extended Emergency Contact Information  Primary Emergency Contact: [address] Phone: [phone]  Relation: Daughter           Health Care Representative/Proxy:  No           Legal Guardian:  No             NAME OF:           HEALTH CARE REPRESENTATIVE/PROXY:                                         OR           LEGAL GUARDIAN, IF NOT :                                               PHONE:  (Day)           (Night)                        (Cell)    8  REASON FOR TRANSFER: (Must include brief medical history and recent changes in physical function or cognition ) assisted living            V/S: /72   Pulse 87   Temp 99 5 °F (37 5 °C) (Tympanic)   Resp 16   Ht 5' 2" (1 575 m)   Wt 56 8 kg (125 lb 3 5 oz)   SpO2 92%   BMI 22 90 kg/m²           PAIN: None    9  PRIMARY DIAGNOSIS: Acute respiratory failure ([address] )      Secondary Diagnosis:         Pacemaker: No      Internal Defib: No          Mental Health Diagnosis (if Applicable):    10  RESTRAINTS: No     11  RESPIRATORY NEEDS: Oxygen Device 2L,    12  ISOLATION/PRECAUTION: None    13  ALLERGY: Patient has no known allergies  14  SENSORY:       Vision Good, Hearing Good  and Speech Clear    15  SKIN CONDITION: No Wounds    16  DIET: Regular    17  IV ACCESS: None    18   PERSONAL ITEMS SENT WITH PATIENT: Walker    19  ATTACHED DOCUMENTS: MUST ATTACH CURRENT MEDICATION INFORMATION Face Sheet and MAR    20  AT RISK ALERTS:Falls        HARM TO: N/A    21  WEIGHT BEARING STATUS:         Left Leg: Limited        Right Leg: Limited    22  MENTAL STATUS:Forgetful and Depressed    23  FUNCTION:        Walk: With Help        Transfer: With Help        Toilet: With Help        Feed: With Help    24  IMMUNIZATIONS/SCREENING:     Immunization History   Administered Date(s) Administered    Pneumococcal Conjugate 13-Valent 11/14/2017       25  BOWEL: Incontinent     26  BLADDER: Incontinent    27   SENDING FACILITY CONTACT: Irma Sullivan                  Title: RN        Unit: 3N        Phone: [phone] [address] (if known):        Title:        Unit:         Phone:         FORM PREFILLED BY (if applicable)       Title:       Unit:        Phone:         FORM COMPLETED BY Georgene Nageotte, RN      Title: QUINTIN      KLDSQ:[phone]

## 2017-11-16 NOTE — NURSING NOTE
IV access discontinued  Report called into receiving facility, given to [facility]  Pt  Left 3N stable, all belongings and O2 tank at side

## 2017-11-17 ENCOUNTER — GENERIC CONVERSION - ENCOUNTER (OUTPATIENT)
Dept: OTHER | Facility: OTHER | Age: 82
End: 2017-11-17

## 2017-11-17 RX ORDER — AMOXICILLIN AND CLAVULANATE POTASSIUM 875; 125 MG/1; MG/1
1 TABLET, FILM COATED ORAL 2 TIMES DAILY
Qty: 14 TABLET | Refills: 0 | Status: SHIPPED | OUTPATIENT
Start: 2017-11-17 | End: 2017-11-22

## 2017-11-17 RX ORDER — IPRATROPIUM BROMIDE AND ALBUTEROL SULFATE 2.5; .5 MG/3ML; MG/3ML
3 SOLUTION RESPIRATORY (INHALATION)
Qty: 3 ML | Refills: 0 | Status: SHIPPED | OUTPATIENT
Start: 2017-11-17

## 2018-01-13 NOTE — MISCELLANEOUS
History of Present Illness  COPD Hospital Discharge Initial Follow-Up Call: The patient is being contacted for follow-up after hospitalization  The date of discharge is 11/16/17  I spoke with daughter and aide at facility  Patient was identified as medium risk for readmission per risk stratification  The patient was discharged to [facility]  The patient never smoked  The patient is experiencing the following symptoms: no wheezing, no cough, no dyspnea, no fever and not coughing up sputum  Zone tool status is yellow  The following topics were reviewed:  rescue vs  maintenance inhalers, energy conservation, physician follow-ups and medication compliance  Narrative Summary:  Patient discharged yesterday and follow-up call made  Daughter has not yet visited her mother since discharge  Called [facility] where the patient resides (Assisted Living) and spoke with Mel Shafer, who is a new employee and was unaware of the medication order on the AVS  She states that patient has diarrhea and has a nebulizer machine but no meds on hand  Reviewed the orders and only the Prednisone was delivered  Called the pharmacy listed on the AVS ([facility] [place]) who does not have the order for Duoneb, Lopressor, Augmentin and Miralax  They do not service [facility] any longer  New pharmacy is [facility]  Called there as well and they do not have script to fill  Contacted RADHA Sherwood at [facility] to assist with order for missing meds  Forwarded to Dr Tesha Servin  Called OP Provider, who was made aware of hospitalization and current diarrhea  She will see patient on Tuesday  Signatures   Electronically signed by :  RT Laurie; Nov 17 2017  3:21PM EST                       (Author)